# Patient Record
Sex: FEMALE | Race: WHITE | URBAN - METROPOLITAN AREA
[De-identification: names, ages, dates, MRNs, and addresses within clinical notes are randomized per-mention and may not be internally consistent; named-entity substitution may affect disease eponyms.]

---

## 2018-02-25 ENCOUNTER — OFFICE VISIT (OUTPATIENT)
Dept: PRIMARY CARE CLINIC | Age: 63
End: 2018-02-25
Payer: MEDICARE

## 2018-02-25 VITALS
TEMPERATURE: 98 F | DIASTOLIC BLOOD PRESSURE: 76 MMHG | OXYGEN SATURATION: 98 % | HEART RATE: 80 BPM | RESPIRATION RATE: 16 BRPM | HEIGHT: 62 IN | BODY MASS INDEX: 29.44 KG/M2 | SYSTOLIC BLOOD PRESSURE: 124 MMHG | WEIGHT: 160 LBS

## 2018-02-25 DIAGNOSIS — H66.003 ACUTE SUPPURATIVE OTITIS MEDIA OF BOTH EARS WITHOUT SPONTANEOUS RUPTURE OF TYMPANIC MEMBRANES, RECURRENCE NOT SPECIFIED: Primary | ICD-10-CM

## 2018-02-25 DIAGNOSIS — H61.23 BILATERAL IMPACTED CERUMEN: ICD-10-CM

## 2018-02-25 PROCEDURE — G8419 CALC BMI OUT NRM PARAM NOF/U: HCPCS | Performed by: NURSE PRACTITIONER

## 2018-02-25 PROCEDURE — G8400 PT W/DXA NO RESULTS DOC: HCPCS | Performed by: NURSE PRACTITIONER

## 2018-02-25 PROCEDURE — 4040F PNEUMOC VAC/ADMIN/RCVD: CPT | Performed by: NURSE PRACTITIONER

## 2018-02-25 PROCEDURE — 3017F COLORECTAL CA SCREEN DOC REV: CPT | Performed by: NURSE PRACTITIONER

## 2018-02-25 PROCEDURE — G8427 DOCREV CUR MEDS BY ELIG CLIN: HCPCS | Performed by: NURSE PRACTITIONER

## 2018-02-25 PROCEDURE — 69210 REMOVE IMPACTED EAR WAX UNI: CPT | Performed by: NURSE PRACTITIONER

## 2018-02-25 PROCEDURE — 1036F TOBACCO NON-USER: CPT | Performed by: NURSE PRACTITIONER

## 2018-02-25 PROCEDURE — 1090F PRES/ABSN URINE INCON ASSESS: CPT | Performed by: NURSE PRACTITIONER

## 2018-02-25 PROCEDURE — 1123F ACP DISCUSS/DSCN MKR DOCD: CPT | Performed by: NURSE PRACTITIONER

## 2018-02-25 PROCEDURE — 99213 OFFICE O/P EST LOW 20 MIN: CPT | Performed by: NURSE PRACTITIONER

## 2018-02-25 PROCEDURE — G8484 FLU IMMUNIZE NO ADMIN: HCPCS | Performed by: NURSE PRACTITIONER

## 2018-02-25 PROCEDURE — 3014F SCREEN MAMMO DOC REV: CPT | Performed by: NURSE PRACTITIONER

## 2018-02-25 RX ORDER — CHLORAL HYDRATE 500 MG
1 CAPSULE ORAL
COMMUNITY

## 2018-02-25 RX ORDER — CYANOCOBALAMIN 1000 UG/ML
1000 INJECTION INTRAMUSCULAR; SUBCUTANEOUS ONCE
COMMUNITY

## 2018-02-25 RX ORDER — CEFDINIR 300 MG/1
600 CAPSULE ORAL DAILY
Qty: 20 CAPSULE | Refills: 0 | Status: SHIPPED | OUTPATIENT
Start: 2018-02-25 | End: 2018-03-07

## 2018-02-25 ASSESSMENT — ENCOUNTER SYMPTOMS
ABDOMINAL PAIN: 0
COUGH: 0
SORE THROAT: 1
VOMITING: 0
SINUS PRESSURE: 1
WHEEZING: 0
RHINORRHEA: 1
SHORTNESS OF BREATH: 0

## 2018-02-25 NOTE — PROGRESS NOTES
Negative for rash. Neurological: Positive for headaches. Objective:   /76   Pulse 80   Temp 98 °F (36.7 °C) (Tympanic)   Resp 16   Ht 5' 1.5\" (1.562 m)   Wt 160 lb (72.6 kg)   SpO2 98%   BMI 29.74 kg/m²     Physical Exam   Constitutional: She is oriented to person, place, and time. She appears well-developed and well-nourished. HENT:   Head: Normocephalic. Right Ear: Ear canal normal. There is tenderness. Left Ear: There is tenderness. Ears:    Nose: Nose normal.   Mouth/Throat: Uvula is midline, oropharynx is clear and moist and mucous membranes are normal. No oropharyngeal exudate. Eyes: Conjunctivae are normal. Right eye exhibits no discharge. Left eye exhibits no discharge. Neck: Normal range of motion. Cardiovascular: Normal rate, regular rhythm and normal heart sounds. Pulmonary/Chest: Effort normal and breath sounds normal. No respiratory distress. She has no wheezes. She has no rales. Lymphadenopathy:     She has no cervical adenopathy. Neurological: She is alert and oriented to person, place, and time. Skin: Skin is warm and dry. Psychiatric: She has a normal mood and affect. Her behavior is normal.       Assessment:      1. Acute suppurative otitis media of both ears without spontaneous rupture of tympanic membranes, recurrence not specified  cefdinir (OMNICEF) 300 MG capsule    neomycin-polymyxin-hydrocortisone (CORTISPORIN) 3.5-82220-5 otic solution   2. Bilateral impacted cerumen  MN REMOVAL IMPACTED CERUMEN INSTRUMENTATION UNILAT       Plan:      Orders Placed This Encounter   Procedures    MN REMOVAL IMPACTED CERUMEN INSTRUMENTATION UNILAT     Ear wax was removed with a mixture of warm water/hydrogen peroxide flush and currette. Small amount of ear wax removed from bilateral ears. Pt tolerated well. No infection present.        Orders Placed This Encounter   Medications    cefdinir (OMNICEF) 300 MG capsule     Sig: Take 2 capsules by mouth daily for 10 days     Dispense:  20 capsule     Refill:  0    neomycin-polymyxin-hydrocortisone (CORTISPORIN) 3.5-64566-8 otic solution     Sig: Place 3 drops into both ears 4 times daily     Dispense:  1 Bottle     Refill:  0       Return if symptoms worsen or fail to improve, for reevaluation and further treatment with PCP. Encouraged increase in fluids and rest. Ibuprofen and tylenol as needed. Discussed otc comfort care. Reviewed with the patient: current clinical status, medications, activities and diet. Side effects, adverse effects of the medication prescribed today, as well as treatment plan/ rationale and result expectations have been discussed with the patient who expresses understanding and desires to proceed. Close follow up to evaluate treatment results and for coordination of care. I have reviewed the patient's medical history in detail and updated the computerized patient record.     Derrek Kehr, CNP

## 2023-07-21 DIAGNOSIS — B00.1 COLD SORE: ICD-10-CM

## 2023-07-21 RX ORDER — VALACYCLOVIR HYDROCHLORIDE 1 G/1
2000 TABLET, FILM COATED ORAL 2 TIMES DAILY
COMMUNITY
Start: 2021-08-06 | End: 2023-07-21 | Stop reason: SDUPTHER

## 2023-07-21 RX ORDER — VALACYCLOVIR HYDROCHLORIDE 1 G/1
2000 TABLET, FILM COATED ORAL 4 TIMES DAILY
Qty: 16 TABLET | Refills: 0 | Status: SHIPPED | OUTPATIENT
Start: 2023-07-21 | End: 2023-08-21 | Stop reason: SDUPTHER

## 2023-07-23 PROBLEM — I25.84 CORONARY ARTERY CALCIFICATION: Status: ACTIVE | Noted: 2023-07-23

## 2023-07-23 PROBLEM — E21.1 SECONDARY HYPERPARATHYROIDISM, NON-RENAL (MULTI): Status: ACTIVE | Noted: 2023-07-23

## 2023-07-23 PROBLEM — H90.3 BILATERAL SENSORINEURAL HEARING LOSS: Status: ACTIVE | Noted: 2023-07-23

## 2023-07-23 PROBLEM — R79.89 TSH ELEVATION: Status: ACTIVE | Noted: 2023-07-23

## 2023-07-23 PROBLEM — B00.1 RECURRENT HERPES LABIALIS: Status: ACTIVE | Noted: 2023-07-23

## 2023-07-23 PROBLEM — K08.109 EDENTULOUS: Status: ACTIVE | Noted: 2023-07-23

## 2023-07-23 PROBLEM — K57.30 DIVERTICULOSIS OF COLON: Status: ACTIVE | Noted: 2023-07-23

## 2023-07-23 PROBLEM — J30.9 ALLERGIC RHINITIS: Status: ACTIVE | Noted: 2023-07-23

## 2023-07-23 PROBLEM — Z92.89 H/O ECHOCARDIOGRAM: Status: RESOLVED | Noted: 2023-07-23 | Resolved: 2023-07-23

## 2023-07-23 PROBLEM — Z71.89 ADVANCED DIRECTIVES, COUNSELING/DISCUSSION: Status: ACTIVE | Noted: 2023-07-23

## 2023-07-23 PROBLEM — M85.9 DISORDER OF BONE DENSITY AND STRUCTURE, UNSPECIFIED: Status: ACTIVE | Noted: 2023-07-23

## 2023-07-23 PROBLEM — F32.1 CURRENT MODERATE EPISODE OF MAJOR DEPRESSIVE DISORDER WITHOUT PRIOR EPISODE (MULTI): Status: ACTIVE | Noted: 2023-07-23

## 2023-07-23 PROBLEM — R73.01 IFG (IMPAIRED FASTING GLUCOSE): Status: ACTIVE | Noted: 2023-07-23

## 2023-07-23 PROBLEM — I25.10 CORONARY ARTERY CALCIFICATION: Status: ACTIVE | Noted: 2023-07-23

## 2023-07-23 PROBLEM — Z71.89 CARDIAC RISK COUNSELING: Status: ACTIVE | Noted: 2023-07-23

## 2023-07-23 PROBLEM — I77.9 DISORDER OF AORTA (CMS-HCC): Status: ACTIVE | Noted: 2022-08-23

## 2023-07-23 PROBLEM — Z92.89 H/O ECHOCARDIOGRAM: Status: ACTIVE | Noted: 2023-07-23

## 2023-07-23 PROBLEM — R93.1 ABNORMAL SCREENING CARDIAC CT: Status: ACTIVE | Noted: 2023-07-23

## 2023-07-23 PROBLEM — E78.5 HYPERLIPIDEMIA: Status: ACTIVE | Noted: 2023-07-23

## 2023-07-23 PROBLEM — Z90.89 H/O MASTOIDECTOMY: Status: ACTIVE | Noted: 2023-07-23

## 2023-07-23 PROBLEM — N20.0 CALCULUS OF KIDNEY: Status: ACTIVE | Noted: 2022-07-01

## 2023-07-23 PROBLEM — K64.0 GRADE I HEMORRHOIDS: Status: ACTIVE | Noted: 2023-07-23

## 2023-07-23 PROBLEM — H60.60 CHRONIC OTITIS EXTERNA: Status: ACTIVE | Noted: 2023-07-23

## 2023-07-23 PROBLEM — Z86.010 HISTORY OF COLON POLYPS: Status: ACTIVE | Noted: 2023-07-23

## 2023-07-23 PROBLEM — K21.00 GASTROESOPHAGEAL REFLUX DISEASE WITH ESOPHAGITIS: Status: ACTIVE | Noted: 2023-07-23

## 2023-07-23 PROBLEM — J44.9 CHRONIC OBSTRUCTIVE PULMONARY DISEASE (MULTI): Status: ACTIVE | Noted: 2022-08-23

## 2023-07-23 PROBLEM — K76.89 LIVER CYST: Status: ACTIVE | Noted: 2023-07-23

## 2023-07-23 PROBLEM — F41.9 ANXIETY: Status: ACTIVE | Noted: 2023-07-23

## 2023-07-23 PROBLEM — I47.19 PAT (PAROXYSMAL ATRIAL TACHYCARDIA) (CMS-HCC): Status: ACTIVE | Noted: 2023-07-23

## 2023-07-23 PROBLEM — I51.9 DIASTOLIC DYSFUNCTION, LEFT VENTRICLE: Status: ACTIVE | Noted: 2023-07-23

## 2023-07-23 PROBLEM — I10 HYPERTENSION: Status: ACTIVE | Noted: 2023-07-23

## 2023-07-23 PROBLEM — N28.1 RENAL CYST, RIGHT: Status: ACTIVE | Noted: 2023-07-23

## 2023-07-23 PROBLEM — K59.00 CONSTIPATION: Status: ACTIVE | Noted: 2023-07-23

## 2023-07-23 PROBLEM — Z86.0100 HISTORY OF COLON POLYPS: Status: ACTIVE | Noted: 2023-07-23

## 2023-07-23 PROBLEM — Z86.39 H/O HYPERPARATHYROIDISM: Status: ACTIVE | Noted: 2023-07-23

## 2023-07-23 PROBLEM — E55.9 VITAMIN D DEFICIENCY: Status: ACTIVE | Noted: 2023-07-23

## 2023-07-23 PROBLEM — I25.9 ASYMPTOMATIC CORONARY HEART DISEASE: Status: ACTIVE | Noted: 2022-08-23

## 2023-07-23 PROBLEM — R91.1 LUNG NODULE: Status: ACTIVE | Noted: 2023-07-23

## 2023-07-23 PROBLEM — Z00.00 ROUTINE ADULT HEALTH MAINTENANCE: Status: ACTIVE | Noted: 2023-07-23

## 2023-08-21 ENCOUNTER — LAB (OUTPATIENT)
Dept: LAB | Facility: LAB | Age: 68
End: 2023-08-21
Payer: MEDICARE

## 2023-08-21 ENCOUNTER — OFFICE VISIT (OUTPATIENT)
Dept: PRIMARY CARE | Facility: CLINIC | Age: 68
End: 2023-08-21
Payer: MEDICARE

## 2023-08-21 VITALS
WEIGHT: 160.5 LBS | SYSTOLIC BLOOD PRESSURE: 126 MMHG | BODY MASS INDEX: 30.3 KG/M2 | HEIGHT: 61 IN | OXYGEN SATURATION: 95 % | TEMPERATURE: 98 F | HEART RATE: 70 BPM | DIASTOLIC BLOOD PRESSURE: 76 MMHG

## 2023-08-21 DIAGNOSIS — E55.9 VITAMIN D DEFICIENCY: ICD-10-CM

## 2023-08-21 DIAGNOSIS — I25.84 CORONARY ARTERY CALCIFICATION: ICD-10-CM

## 2023-08-21 DIAGNOSIS — Z12.11 SCREENING FOR COLON CANCER: ICD-10-CM

## 2023-08-21 DIAGNOSIS — Z71.89 CARDIAC RISK COUNSELING: ICD-10-CM

## 2023-08-21 DIAGNOSIS — Z12.31 ENCOUNTER FOR SCREENING MAMMOGRAM FOR BREAST CANCER: ICD-10-CM

## 2023-08-21 DIAGNOSIS — E21.1 SECONDARY HYPERPARATHYROIDISM, NON-RENAL (MULTI): ICD-10-CM

## 2023-08-21 DIAGNOSIS — Z00.00 ROUTINE ADULT HEALTH MAINTENANCE: Primary | ICD-10-CM

## 2023-08-21 DIAGNOSIS — E78.5 HYPERLIPIDEMIA, UNSPECIFIED HYPERLIPIDEMIA TYPE: ICD-10-CM

## 2023-08-21 DIAGNOSIS — Z71.89 ADVANCED DIRECTIVES, COUNSELING/DISCUSSION: ICD-10-CM

## 2023-08-21 DIAGNOSIS — F32.1 CURRENT MODERATE EPISODE OF MAJOR DEPRESSIVE DISORDER WITHOUT PRIOR EPISODE (MULTI): ICD-10-CM

## 2023-08-21 DIAGNOSIS — I10 HYPERTENSION, UNSPECIFIED TYPE: ICD-10-CM

## 2023-08-21 DIAGNOSIS — I25.10 CORONARY ARTERY CALCIFICATION: ICD-10-CM

## 2023-08-21 DIAGNOSIS — K21.00 GASTROESOPHAGEAL REFLUX DISEASE WITH ESOPHAGITIS, UNSPECIFIED WHETHER HEMORRHAGE: ICD-10-CM

## 2023-08-21 DIAGNOSIS — I47.19 PAT (PAROXYSMAL ATRIAL TACHYCARDIA) (CMS-HCC): ICD-10-CM

## 2023-08-21 DIAGNOSIS — B00.1 COLD SORE: ICD-10-CM

## 2023-08-21 DIAGNOSIS — J44.9 CHRONIC OBSTRUCTIVE PULMONARY DISEASE, UNSPECIFIED COPD TYPE (MULTI): ICD-10-CM

## 2023-08-21 DIAGNOSIS — Z13.89 SCREENING FOR MULTIPLE CONDITIONS: ICD-10-CM

## 2023-08-21 PROBLEM — Z90.89 H/O MASTOIDECTOMY: Status: RESOLVED | Noted: 2023-07-23 | Resolved: 2023-08-21

## 2023-08-21 PROBLEM — I25.9 ASYMPTOMATIC CORONARY HEART DISEASE: Status: RESOLVED | Noted: 2022-08-23 | Resolved: 2023-08-21

## 2023-08-21 PROBLEM — Z86.39 H/O HYPERPARATHYROIDISM: Status: RESOLVED | Noted: 2023-07-23 | Resolved: 2023-08-21

## 2023-08-21 PROBLEM — H60.60 CHRONIC OTITIS EXTERNA: Status: RESOLVED | Noted: 2023-07-23 | Resolved: 2023-08-21

## 2023-08-21 PROBLEM — I73.9 PVD (PERIPHERAL VASCULAR DISEASE) (CMS-HCC): Status: ACTIVE | Noted: 2022-08-23

## 2023-08-21 PROBLEM — I73.9 PVD (PERIPHERAL VASCULAR DISEASE) (CMS-HCC): Status: RESOLVED | Noted: 2022-08-23 | Resolved: 2023-08-21

## 2023-08-21 LAB
ALANINE AMINOTRANSFERASE (SGPT) (U/L) IN SER/PLAS: 14 U/L (ref 7–45)
ALBUMIN (G/DL) IN SER/PLAS: 4.4 G/DL (ref 3.4–5)
ALBUMIN (MG/L) IN URINE: <7 MG/L
ALBUMIN/CREATININE (UG/MG) IN URINE: NORMAL UG/MG CRT (ref 0–30)
ALKALINE PHOSPHATASE (U/L) IN SER/PLAS: 50 U/L (ref 33–136)
ANION GAP IN SER/PLAS: 12 MMOL/L (ref 10–20)
APPEARANCE, URINE: CLEAR
ASPARTATE AMINOTRANSFERASE (SGOT) (U/L) IN SER/PLAS: 18 U/L (ref 9–39)
BACTERIA, URINE: ABNORMAL /HPF
BASOPHILS (10*3/UL) IN BLOOD BY AUTOMATED COUNT: 0.06 X10E9/L (ref 0–0.1)
BASOPHILS/100 LEUKOCYTES IN BLOOD BY AUTOMATED COUNT: 1.2 % (ref 0–2)
BILIRUBIN TOTAL (MG/DL) IN SER/PLAS: 0.5 MG/DL (ref 0–1.2)
BILIRUBIN, URINE: NEGATIVE
BLOOD, URINE: NEGATIVE
CALCIDIOL (25 OH VITAMIN D3) (NG/ML) IN SER/PLAS: 46 NG/ML
CALCIUM (MG/DL) IN SER/PLAS: 9.9 MG/DL (ref 8.6–10.3)
CARBON DIOXIDE, TOTAL (MMOL/L) IN SER/PLAS: 30 MMOL/L (ref 21–32)
CHLORIDE (MMOL/L) IN SER/PLAS: 103 MMOL/L (ref 98–107)
CHOLESTEROL (MG/DL) IN SER/PLAS: 175 MG/DL (ref 0–199)
CHOLESTEROL IN HDL (MG/DL) IN SER/PLAS: 56.1 MG/DL
CHOLESTEROL/HDL RATIO: 3.1
COLOR, URINE: YELLOW
CREATININE (MG/DL) IN SER/PLAS: 0.8 MG/DL (ref 0.5–1.05)
CREATININE (MG/DL) IN URINE: 69 MG/DL (ref 20–320)
EOSINOPHILS (10*3/UL) IN BLOOD BY AUTOMATED COUNT: 0.35 X10E9/L (ref 0–0.7)
EOSINOPHILS/100 LEUKOCYTES IN BLOOD BY AUTOMATED COUNT: 7.3 % (ref 0–6)
ERYTHROCYTE DISTRIBUTION WIDTH (RATIO) BY AUTOMATED COUNT: 12.3 % (ref 11.5–14.5)
ERYTHROCYTE MEAN CORPUSCULAR HEMOGLOBIN CONCENTRATION (G/DL) BY AUTOMATED: 32.4 G/DL (ref 32–36)
ERYTHROCYTE MEAN CORPUSCULAR VOLUME (FL) BY AUTOMATED COUNT: 95 FL (ref 80–100)
ERYTHROCYTES (10*6/UL) IN BLOOD BY AUTOMATED COUNT: 4.63 X10E12/L (ref 4–5.2)
GFR FEMALE: 79 ML/MIN/1.73M2
GLUCOSE (MG/DL) IN SER/PLAS: 86 MG/DL (ref 74–99)
GLUCOSE, URINE: NEGATIVE MG/DL
HEMATOCRIT (%) IN BLOOD BY AUTOMATED COUNT: 44.1 % (ref 36–46)
HEMOGLOBIN (G/DL) IN BLOOD: 14.3 G/DL (ref 12–16)
IMMATURE GRANULOCYTES/100 LEUKOCYTES IN BLOOD BY AUTOMATED COUNT: 0.2 % (ref 0–0.9)
KETONES, URINE: NEGATIVE MG/DL
LDL: 107 MG/DL (ref 0–99)
LEUKOCYTE ESTERASE, URINE: ABNORMAL
LEUKOCYTES (10*3/UL) IN BLOOD BY AUTOMATED COUNT: 4.8 X10E9/L (ref 4.4–11.3)
LYMPHOCYTES (10*3/UL) IN BLOOD BY AUTOMATED COUNT: 1.62 X10E9/L (ref 1.2–4.8)
LYMPHOCYTES/100 LEUKOCYTES IN BLOOD BY AUTOMATED COUNT: 33.6 % (ref 13–44)
MONOCYTES (10*3/UL) IN BLOOD BY AUTOMATED COUNT: 0.45 X10E9/L (ref 0.1–1)
MONOCYTES/100 LEUKOCYTES IN BLOOD BY AUTOMATED COUNT: 9.3 % (ref 2–10)
NEUTROPHILS (10*3/UL) IN BLOOD BY AUTOMATED COUNT: 2.33 X10E9/L (ref 1.2–7.7)
NEUTROPHILS/100 LEUKOCYTES IN BLOOD BY AUTOMATED COUNT: 48.4 % (ref 40–80)
NITRITE, URINE: NEGATIVE
PARATHYRIN INTACT (PG/ML) IN SER/PLAS: 102.6 PG/ML (ref 18.5–88)
PH, URINE: 7 (ref 5–8)
PLATELETS (10*3/UL) IN BLOOD AUTOMATED COUNT: 187 X10E9/L (ref 150–450)
POTASSIUM (MMOL/L) IN SER/PLAS: 4.4 MMOL/L (ref 3.5–5.3)
PROTEIN TOTAL: 6.8 G/DL (ref 6.4–8.2)
PROTEIN, URINE: NEGATIVE MG/DL
RBC, URINE: 1 /HPF (ref 0–5)
SODIUM (MMOL/L) IN SER/PLAS: 141 MMOL/L (ref 136–145)
SPECIFIC GRAVITY, URINE: 1.01 (ref 1–1.03)
SQUAMOUS EPITHELIAL CELLS, URINE: <1 /HPF
THYROTROPIN (MIU/L) IN SER/PLAS BY DETECTION LIMIT <= 0.05 MIU/L: 3.72 MIU/L (ref 0.44–3.98)
TRIGLYCERIDE (MG/DL) IN SER/PLAS: 61 MG/DL (ref 0–149)
UREA NITROGEN (MG/DL) IN SER/PLAS: 17 MG/DL (ref 6–23)
UROBILINOGEN, URINE: <2 MG/DL (ref 0–1.9)
VLDL: 12 MG/DL (ref 0–40)
WBC, URINE: 3 /HPF (ref 0–5)

## 2023-08-21 PROCEDURE — 1036F TOBACCO NON-USER: CPT | Performed by: INTERNAL MEDICINE

## 2023-08-21 PROCEDURE — 82570 ASSAY OF URINE CREATININE: CPT

## 2023-08-21 PROCEDURE — 1125F AMNT PAIN NOTED PAIN PRSNT: CPT | Performed by: INTERNAL MEDICINE

## 2023-08-21 PROCEDURE — G0439 PPPS, SUBSEQ VISIT: HCPCS | Performed by: INTERNAL MEDICINE

## 2023-08-21 PROCEDURE — G0444 DEPRESSION SCREEN ANNUAL: HCPCS | Performed by: INTERNAL MEDICINE

## 2023-08-21 PROCEDURE — 84443 ASSAY THYROID STIM HORMONE: CPT

## 2023-08-21 PROCEDURE — 85025 COMPLETE CBC W/AUTO DIFF WBC: CPT

## 2023-08-21 PROCEDURE — 80053 COMPREHEN METABOLIC PANEL: CPT

## 2023-08-21 PROCEDURE — 1159F MED LIST DOCD IN RCRD: CPT | Performed by: INTERNAL MEDICINE

## 2023-08-21 PROCEDURE — 82306 VITAMIN D 25 HYDROXY: CPT

## 2023-08-21 PROCEDURE — G0009 ADMIN PNEUMOCOCCAL VACCINE: HCPCS | Performed by: INTERNAL MEDICINE

## 2023-08-21 PROCEDURE — 82043 UR ALBUMIN QUANTITATIVE: CPT

## 2023-08-21 PROCEDURE — 3074F SYST BP LT 130 MM HG: CPT | Performed by: INTERNAL MEDICINE

## 2023-08-21 PROCEDURE — 81001 URINALYSIS AUTO W/SCOPE: CPT

## 2023-08-21 PROCEDURE — 99214 OFFICE O/P EST MOD 30 MIN: CPT | Performed by: INTERNAL MEDICINE

## 2023-08-21 PROCEDURE — 3078F DIAST BP <80 MM HG: CPT | Performed by: INTERNAL MEDICINE

## 2023-08-21 PROCEDURE — 83970 ASSAY OF PARATHORMONE: CPT

## 2023-08-21 PROCEDURE — G0446 INTENS BEHAVE THER CARDIO DX: HCPCS | Performed by: INTERNAL MEDICINE

## 2023-08-21 PROCEDURE — 80061 LIPID PANEL: CPT

## 2023-08-21 PROCEDURE — 90677 PCV20 VACCINE IM: CPT | Performed by: INTERNAL MEDICINE

## 2023-08-21 PROCEDURE — 36415 COLL VENOUS BLD VENIPUNCTURE: CPT

## 2023-08-21 PROCEDURE — 99497 ADVNCD CARE PLAN 30 MIN: CPT | Performed by: INTERNAL MEDICINE

## 2023-08-21 RX ORDER — SERTRALINE HYDROCHLORIDE 50 MG/1
50 TABLET, FILM COATED ORAL DAILY
Qty: 90 TABLET | Refills: 3 | Status: SHIPPED | OUTPATIENT
Start: 2023-08-21 | End: 2024-02-15 | Stop reason: SDUPTHER

## 2023-08-21 RX ORDER — VALACYCLOVIR HYDROCHLORIDE 1 G/1
2000 TABLET, FILM COATED ORAL 2 TIMES DAILY
Qty: 12 TABLET | Refills: 3 | Status: SHIPPED | OUTPATIENT
Start: 2023-08-21

## 2023-08-21 RX ORDER — METOPROLOL SUCCINATE 50 MG/1
50 TABLET, EXTENDED RELEASE ORAL DAILY
Qty: 90 TABLET | Refills: 3
Start: 2023-08-21 | End: 2023-12-10

## 2023-08-21 RX ORDER — SERTRALINE HYDROCHLORIDE 50 MG/1
50 TABLET, FILM COATED ORAL DAILY
COMMUNITY
End: 2023-08-21 | Stop reason: SDUPTHER

## 2023-08-21 RX ORDER — ALBUTEROL SULFATE 90 UG/1
2 AEROSOL, METERED RESPIRATORY (INHALATION) EVERY 4 HOURS PRN
COMMUNITY
Start: 2010-02-01

## 2023-08-21 RX ORDER — OMEPRAZOLE 40 MG/1
40 CAPSULE, DELAYED RELEASE ORAL
Qty: 90 CAPSULE | Refills: 3
Start: 2023-08-21 | End: 2023-11-22 | Stop reason: SDUPTHER

## 2023-08-21 RX ORDER — ROSUVASTATIN CALCIUM 10 MG/1
10 TABLET, COATED ORAL DAILY
Qty: 90 TABLET | Refills: 3
Start: 2023-08-21 | End: 2024-03-18 | Stop reason: SDUPTHER

## 2023-08-21 RX ORDER — CETIRIZINE HYDROCHLORIDE, PSEUDOEPHEDRINE HYDROCHLORIDE 5; 120 MG/1; MG/1
1 TABLET, FILM COATED, EXTENDED RELEASE ORAL 2 TIMES DAILY
COMMUNITY
Start: 2019-09-08 | End: 2024-04-18 | Stop reason: WASHOUT

## 2023-08-21 RX ORDER — LANOLIN ALCOHOL/MO/W.PET/CERES
1 CREAM (GRAM) TOPICAL DAILY
COMMUNITY
Start: 2021-08-06

## 2023-08-21 RX ORDER — BUDESONIDE AND FORMOTEROL FUMARATE DIHYDRATE 160; 4.5 UG/1; UG/1
2 AEROSOL RESPIRATORY (INHALATION) 2 TIMES DAILY
COMMUNITY
Start: 2023-01-31 | End: 2024-03-18 | Stop reason: WASHOUT

## 2023-08-21 RX ORDER — ACETAMINOPHEN 500 MG
1 TABLET ORAL DAILY
COMMUNITY
Start: 2021-08-06

## 2023-08-21 RX ORDER — FAMOTIDINE 20 MG/1
20 TABLET, FILM COATED ORAL 2 TIMES DAILY
Qty: 180 TABLET | Refills: 3
Start: 2023-08-21 | End: 2024-02-15 | Stop reason: WASHOUT

## 2023-08-21 RX ORDER — IBUPROFEN 100 MG/5ML
1 SUSPENSION, ORAL (FINAL DOSE FORM) ORAL DAILY
COMMUNITY
Start: 2021-08-06

## 2023-08-21 ASSESSMENT — PATIENT HEALTH QUESTIONNAIRE - PHQ9
1. LITTLE INTEREST OR PLEASURE IN DOING THINGS: NOT AT ALL
2. FEELING DOWN, DEPRESSED OR HOPELESS: NOT AT ALL
SUM OF ALL RESPONSES TO PHQ9 QUESTIONS 1 AND 2: 0

## 2023-08-21 NOTE — ASSESSMENT & PLAN NOTE
Annual Wellness exam completed   Preventive Health history reviewed:  Vaccines today: PCV 20  Labs ordered    Mammogram ordered  Cscope ordered    Depression Screening done  Advanced Directives Discussion Completed  Cardiovascular risk discussed and if needed, lifestyle modifications recommended, including nutritional choices, exercise, and elimination of habits contributing to risk.  We agreed on a plan to reduce the current cardiovascular risk.  See ecalc ASCVD Risk  Plus for data discussed regarding risk and risk reduction opportunities.  Aspirin use/disuse was discussed after reviewing the updated guidelines.

## 2023-08-21 NOTE — PROGRESS NOTES
Assessment and Plan:  Problem List Items Addressed This Visit          High    Routine adult health maintenance - Primary    Overview     Influenza 9/27/21, 10/1/22  Pfizer COVID 19 vaccine 1/28/21, 2/18/21, 9/28/21, 4/23/22, 10/1/22  Shingrix Oct 26 2020, Mar  8, 2021          Tdap (1) Apr 2, 2012   Td 8/6/21       Pneumovax 23 5/5/2020  Prevnar 7/26/18  Mamm 10/15/2004; 8/24/21, 8/23/22  Cscope 2018 (5yrs)  BMD:8/17/21, 8/18/23 8/8/22: 8/8/22: Current 10-Year ASCVD Risk:  12.44% - Intermediate Risk   11/21 US aorta (-), 7/22 US also (-)         Current Assessment & Plan     Annual Wellness exam completed   Preventive Health history reviewed:  Vaccines today: PCV 20  Labs ordered    Mammogram ordered  Cscope ordered    Depression Screening done  Advanced Directives Discussion Completed  Cardiovascular risk discussed and if needed, lifestyle modifications recommended, including nutritional choices, exercise, and elimination of habits contributing to risk.  We agreed on a plan to reduce the current cardiovascular risk.  See ecalc ASCVD Risk  Plus for data discussed regarding risk and risk reduction opportunities.  Aspirin use/disuse was discussed after reviewing the updated guidelines.            Relevant Orders    Pneumococcal conjugate vaccine, 20-valent, adult (PREVNAR 20) (Completed)       Medium    Advanced directives, counseling/discussion    Overview     8/21/23: Maico Martinez (), Janey Noonan (Daughter) is her HCPOA  Full Code unless vegetative state anticipated         Cardiac risk counseling    Overview     8/21/23: Current 10-Year ASCVD Risk:  12% - Intermediate Risk    ASA not indicated         Chronic obstructive pulmonary disease (CMS/HCC)    Overview     COPD: Gold stage III based on most recent pulmonary function test.   Her MMRC is 1 - Group C  On Symbicort (inconsistent) and short acting bronchodilators every 4-6 hours as needed   CT chest 8/22: Moderate upper lung predominant  emphysema.  Former smoker  PFTs 2018: Severe COPD  Managed by  Pulm  Stable         Cold sore    Relevant Medications    valACYclovir (Valtrex) 1 gram tablet    Coronary artery calcification    Overview     8/22: Ct lung:. Moderate coronary artery calcification.   Severe calcification of the thoracic and abdominal aorta  On statin         Relevant Medications    metoprolol succinate XL (Toprol-XL) 50 mg 24 hr tablet    rosuvastatin (Crestor) 10 mg tablet    Current moderate episode of major depressive disorder without prior episode (CMS/HCC)    Overview     On Zoloft  Stable  Monitor         Relevant Medications    sertraline (Zoloft) 50 mg tablet    Encounter for screening mammogram for breast cancer    Relevant Orders    BI mammo bilateral screening tomosynthesis    Gastroesophageal reflux disease with esophagitis    Overview     2018: EGD showed a 2 cm hiatal hernia and LA grade A esophagitis.;  w/HH  on Pepcid prn and PPI daily         Relevant Medications    famotidine (Pepcid) 20 mg tablet    omeprazole (PriLOSEC) 40 mg DR capsule    Hyperlipidemia    Overview     Comment on above: Goal LDL <100;  Goal LDL <100on statin monitor;  Goal LDL <100on statin 2x/weekmonitor;         Relevant Medications    rosuvastatin (Crestor) 10 mg tablet    Other Relevant Orders    TSH with reflex to Free T4 if abnormal    Comprehensive Metabolic Panel    CBC and Auto Differential    Lipid Panel    Hypertension    Overview     Comment on above: Goal <130/80NAS dietExercise;  Goal <130/80NAS dietACEi caused coughTolerated BB in past;         Relevant Orders    Urinalysis with Reflex Microscopic    Comprehensive Metabolic Panel    CBC and Auto Differential    Lipid Panel    Albumin, urine, random    PAT (paroxysmal atrial tachycardia) (CMS/HCC)    Overview      ZioPatch 5/7/18 demonstrated a short run of atrial tachycardia.   Triggered event corresponded to sinus rhythm  on Toprol;         Relevant Medications    metoprolol  succinate XL (Toprol-XL) 50 mg 24 hr tablet    Screening for colon cancer    Relevant Orders    Colonoscopy    Screening for multiple conditions    Overview     Depression screening completed using the PHQ-2 questions with results documented in the chart/encounter  (See Rooming Screenings for documentation)           Secondary hyperparathyroidism, non-renal (CMS/HCC)    Overview     8/21: Vit D 35  7/22: .8 (was on Vitamin D once daily), previously *87.1 (was on Vitamin D 2 /day)  S/P endo consult         Relevant Orders    PTH, intact    Vitamin D deficiency    Overview     Goal ~40 (hx kidney stones)  On supplement  2/daily keeps her PTH normalized         Relevant Orders    Vitamin D, Total    Comprehensive Metabolic Panel    CBC and Auto Differential       Chief Complaint:   Medicare Wellness Exam/Comprehensive Problem Focused Follow Up and Physical Exam    HPI: Saw Cardiology 1/23: (Copied):  Impressions     This 67-year-old hypertensive former smoker with hyperlipidemia, and a strong family history of premature coronary artery disease complains of episodic palpitations as described. Prior work-up in 2018 with Dr. Mueller disclosed a noninvasive monitor study revealing a run of atrial tachycardia, managed with metoprolol. She has coronary artery calcification seen on multiple CT scans in the past, and a coronary artery calcium score done in August 2021 was elevated at 328. A SPECT done in September 2021 was negative for ischemia, and she has normal LV function without significant valvular or pericardial disease by echocardiography in 2018.     The patient's 10 year event rate according to [place] with calcium score is 12%, placing the patient at intermediate risk for future cardiac events. Our approach:     1. Event monitor  2. I have reviewed her recent labs including lipid profile from July 2022.  3. At her next visit, we will discuss increasing her rosuvastatin.    Saw Endo 6/22 (copied):  Provider  Impressions     - secondary hyperparathyroidism due to vitamin D deficiency now normalized ,she is now consistent on vitamin D 4000 IU supplement .  - Osteoporosis likely due to early surgical menopause.  - subclinical hypothyroidism vs abnormal TSH due to high dose Biotin use (managed by pcp )     follow up 1/2023 with labs     Saw Pulm 1/22 (copied):  Patient Discussion/Summary     Ms. Salazar is a 68 year old CF (Former smoker ~55 pack years) here for initial evaluation of COPD and abnormal CT chest.      1. COPD: former smoker - PFTs with severe obstruction in 2018 - BD resp.   - start symbicort 160 2 puffs twice daily - rinse mouth out afterwards- any issues obtaining this call my nurse   - continue albuterol HFA inhaler 2 puffs or albuterol nebulizer treatment every 4-6 hours as needed for shortness of breath   - PFTs and 6MWT - breathing tests before next appt      2. Former smoker: quit smoking 12 years ago   - lung cancer screening in 11/2023     She is inconsistent with symbicort    Last labs reviewed:  Component      Latest Ref Rng 1/9/2023   WBC      4.4 - 11.3 x10E9/L 4.9    RBC      4.00 - 5.20 x10E12/L 4.85    HEMOGLOBIN      12.0 - 16.0 g/dL 14.9    HEMATOCRIT      36.0 - 46.0 % 46.3 (H)    MCV      80 - 100 fL 95    MCHC      32.0 - 36.0 g/dL 32.2    Platelets      150 - 450 x10E9/L 218    RED CELL DISTRIBUTION WIDTH      11.5 - 14.5 % 12.3    Neutrophils %      40.0 - 80.0 % 51.9    Immature Granulocytes %, Automated      0.0 - 0.9 % 0.2    Lymphocytes %      13.0 - 44.0 % 32.5    Monocytes %      2.0 - 10.0 % 9.1    Eosinophils %      0.0 - 6.0 % 5.1    Basophils %      0.0 - 2.0 % 1.2    Neutrophils Absolute      1.20 - 7.70 x10E9/L 2.52    Lymphocytes Absolute      1.20 - 4.80 x10E9/L 1.58    Monocytes Absolute      0.10 - 1.00 x10E9/L 0.44    Eosinophils Absolute      0.00 - 0.70 x10E9/L 0.25    Basophils Absolute      0.00 - 0.10 x10E9/L 0.06    GLUCOSE      74 - 99 mg/dL 87    SODIUM       136 - 145 mmol/L 141    POTASSIUM      3.5 - 5.3 mmol/L 4.1    CHLORIDE      98 - 107 mmol/L 103    Bicarbonate      21 - 32 mmol/L 30    Anion Gap      10 - 20 mmol/L 12    Blood Urea Nitrogen      6 - 23 mg/dL 11    Creatinine      0.50 - 1.05 mg/dL 0.83    GFR Female      >90 mL/min/1.73m2 76    Calcium      8.6 - 10.3 mg/dL 9.9    PHOSPHORUS      2.5 - 4.9 mg/dL 3.5    Albumin      3.4 - 5.0 g/dL 4.3    Hemoglobin A1C      % 5.4    Estimated Average Glucose      MG/    Triiodothyronine, Free      2.3 - 4.2 pg/mL 3.1    Thyroid Stimulating Hormone      0.44 - 3.98 mIU/L 4.51 (H)    Vitamin D, 25-Hydroxy, Total      ng/mL 44    Parathyroid Hormone, Intact      18.5 - 88.0 pg/mL 117.3 (H)    Thyroxine, Free      0.61 - 1.12 ng/dL 0.85         Patient Care Team:  Katiuska Cardenas MD as PCP - General  Katiuska Cardenas MD as PCP - [facility]  Violet Crowell APRN-CNP as Nurse Practitioner (Pulmonary Disease)  Diana Geronimo MD as Referring Physician (Endocrinology)   [facility]    Active Problem List  Patient Active Problem List   Diagnosis    Abnormal screening cardiac CT    Routine adult health maintenance    History of colon polyps    BMI 30.0-30.9,adult    Cardiac risk counseling    Advanced directives, counseling/discussion    Allergic rhinitis    Anxiety    Bilateral sensorineural hearing loss    Chronic obstructive pulmonary disease (CMS/HCC)    Constipation    Current moderate episode of major depressive disorder without prior episode (CMS/HCC)    Coronary artery calcification    Diastolic dysfunction, left ventricle    Disorder of bone density and structure, unspecified    Diverticulosis of colon    Edentulous    Gastroesophageal reflux disease with esophagitis    Grade I hemorrhoids    Liver cyst    Hypertension    Hyperlipidemia    IFG (impaired fasting glucose)    Lung nodule    PAT (paroxysmal atrial tachycardia) (CMS/HCC)    Recurrent herpes labialis    Calculus of  kidney    Renal cyst, right    Secondary hyperparathyroidism, non-renal (CMS/HCC)    TSH elevation    Vitamin D deficiency    Screening for multiple conditions    Screening for colon cancer    Encounter for screening mammogram for breast cancer    Cold sore         Comprehensive Medical/Surgical/Social/Family History  Past Medical History:   Diagnosis Date    Abnormal auditory function study     :  mild to moderate sensorinueural hearing loss in the right ear and a moderate to severe mixed hearing loss in the left. Word recognition was 88% in the right and 32% in the left.    Abnormal screening cardiac CT 2022: 1. Coronary artery calcium score of 328*. (LAD, LCX, RCA, LM) 2. [place] 90th percentile** for age, gender, and race in asymptomatic patients. Coronary Artery  Agatston Score risk:Moderately increased >300 Nuclear stress test (-) 10/21 On statin    H/O bone density study     21: T score -1.9 FRAX* 10-year Probability of Fracture Based on femoral neck BMD: DualFemur (Right) Major Osteoporotic Fracture: 11.1% Hip Fracture:                1.9%    H/O bone density study 2023    LOwest T score -1.7; 10-year Fracture Risk: Major Osteoporotic Fracture  10.3 Hip Fracture                        1.7    H/O cardiovascular stress test     : 1. No nuclear evidence of pharmacologic stress-induced ischemia or scar. 2. Vigorous LV function, EF greater than 75%. 3. Based on these findings low likelihood of obstructive coronary artery disease.    H/O chest x-ray     2017: normal :normal    H/O CT scan of chest     CT Lung screenin/22: 1.  11 mm pleural-based nodular opacity in the right upper lobe which is likely an area of scarring, however recommend follow-up. 2. Moderate upper lung predominant emphysema. 3. Moderate coronary artery calcification. Severe calcification of the thoracic/abdominal aorta. : 1. Right apical pleural thickening is stable/post inflammatory/infectious.     H/O CT scan of chest 11/23/2022    1. Right apical pleural thickening is stable and felt to be post inflammatory/infectious. There are no suspicious parenchymal lung nodules. Recommendations are for continued 1 year low-dose chest CT for surveillance. 2. Severe coronary artery calcifications and correlate with coronary artery disease    H/O diagnostic ultrasound     8/2021 RUQ: No detected cause for patient's symptoms. Liver demonstrates normal size and morphology with a 1 cm simple cyst. 14 mm simple cyst right kidney. 7/1/22: No solid or cystic lesions are identified within the liver on today's examination. . Right nephrolithiasis, 1cm Small right renal cyst, 1.7cm 1/3/23: US kidney: Small cystic lesion suggested within the midpole of the right kidney    H/O diagnostic ultrasound     1/23: US kidney: Small cystic lesion within the midpole of the right kidney is better demonstrated on comparison exam.   Bilateral nephrolithiasis.    H/O Doppler ultrasound     11/21: US aorta (-)    H/O echocardiogram     5/21/18: normal LV size and function, EF 60-65%. Grade 1 diastolic dysfunction. Normal RV size and function. Trace TR.    H/O exercise stress test     5/7/18:no ST or T changes. Duke treadmill score of 7    History of Holter monitoring     ZioPatch 5/7/18 demonstrated a short run of atrial tachycardia. Triggered event corresponded to sinus rhythm.    History of PFTs 08/17/2021    2018: Severe COPD     Past Surgical History:   Procedure Laterality Date    CHOLECYSTECTOMY  08/12/2020    Cholecystectomy    COLONOSCOPY  05/30/2021    2018 diverticulosis in the descending colon, sigmoid colon and  hemorrhoids.    ESOPHAGOGASTRODUODENOSCOPY  05/30/2021    2018: EGD showed a 2 cm hiatal hernia and LA grade A esophagitis.    HYSTERECTOMY  08/06/2021    s/p MARCIO, ovaries remain    MASTOID SURGERY  05/30/2021 7/20/20  Left tympanoplasty, mastoidectomy, posterior atticotomy, facial recess, composite cartilage/ perichondrium  graft.Cultures from OR grew Aspergillus Niger    TYMPANOPLASTY  2021  Left tympanoplasty, mastoidectomy, posterior atticotomy, facial recess, composite cartilage/ perichondrium graft.Cultures from OR grew Aspergillus Niger     Social History     Social History Narrative    , 2 kids    Retired    Former smoker: Quit , Age [age] 1.5 packs/day    No ETOH    ---    Family History:    F; Lung CA ( age 78)    M: Colon CA ( age 70s)    B: AML Leukemia( age 74)    S: Ovarian CA ( 72)    S: CAD ( 69)    No living siblings    D: Janey Noonan    D: Susan Lance     Tobacco/Alcohol/Opioid use, as well as Illicit Drug Use was screened for/reviewed and documented in Social Documentation section of the chart and medication list as appropriate    Allergies and Medications  Lisinopril and Penicillins  Current Outpatient Medications   Medication Instructions    albuterol 90 mcg/actuation inhaler 2 puffs, inhalation, Every 4 hours PRN    ascorbic acid (Vitamin C) 1,000 mg tablet 1 tablet, oral, Daily    cetirizine-pseudoephedrine (ZyrTEC-D) 5-120 mg 12 hr tablet 1 tablet, oral, 2 times daily    cholecalciferol (Vitamin D-3) 50 mcg (2,000 unit) capsule 2 capsules, oral, Daily    cyanocobalamin (Vitamin B-12) 1,000 mcg tablet 1 tablet, oral, Daily    famotidine (PEPCID) 20 mg, oral, 2 times daily    fish oil concentrate (Omega-3) 120-180 mg capsule 1 g, oral, Daily    metoprolol succinate XL (TOPROL-XL) 50 mg, oral, Daily, Do not crush or chew.    omeprazole (PRILOSEC) 40 mg, oral, Daily before breakfast, Do not crush or chew.    rosuvastatin (CRESTOR) 10 mg, oral, Daily    sertraline (ZOLOFT) 50 mg, oral, Daily    Symbicort 160-4.5 mcg/actuation inhaler 2 puffs, inhalation, 2 times daily    valACYclovir (VALTREX) 2,000 mg, oral, 2 times daily     Medications and Supplements  prescribed by me and other practitioners or clinical pharmacist (such as prescriptions, OTC's,  "herbal therapies and supplements) were reviewed and documented in the medical record.      Activities of Daily Living  In your present state of health, do you have any difficulty performing the following activities?:   Preparing food and eating?: No  Bathing yourself: No  Getting dressed: No  Using the toilet:No  Moving around from place to place: No  In the past year have you fallen or had a near fall?:No  Able to manage finances independently: Yes  Able to perform grocery shopping: Yes  Able to manage medications independently: Yes  Able to do housework independently: Yes  Patient self-assessment of health status? Good    Depression Screen  (Note: if answer to either of the following is \"Yes\", then a more complete depression screening is indicated)   Q1: Over the past two weeks, have you felt down, depressed or hopeless? No  Q2: Over the past two weeks, have you felt little interest or pleasure in doing things? No    Current exercise habits: no   Dietary issues discussed: Yes  Hearing difficulties: Yes  Safe in current home environment: Yes  Visual Acuity assessed: Yes  Cognitive Impairment No    Advance directives  Advanced Care Planning (including a Living Will, Healthcare POA, as well as specific end of life choices and/or directives), was discussed for approximately 16 minutes with the patient and/or surrogate, voluntarily, and documented in the Problem List of the medical record.     Cardiac Risk Assessment  Cardiovascular risk was discussed and, if needed, lifestyle modifications recommended, including nutritional choices, exercise, and elimination of habits contributing to risk. We agreed on a plan to reduce the current cardiovascular risk based on above discussion as needed.  Aspirin use/disuse was discussed and documented in the Problem List of the medical record after reviewing the updated guidelines below:    Consider low dose Aspirin ( mg) use if the benefit for cardiovascular disease prevention " "outweighs risk for bleeding complications.   In general, low dose ASA should be considered:  In patients WITHOUT prior MI/stroke/PAD (primary prevention):   a. Age <60: Use if 10-year cardiovascular disease risk >20%, with discussion of risks and benefits with patient  b. Age 60-<70: Use if 10-year cardiovascular disease risk >20% and low bleeding (e.g., gastrointenstinal) risk, with discussion of risks and benefits with patient  c. Age >=70: Do not use    In patients WITH prior MI/stroke/PAD (secondary prevention):   Generally use unless extremely high bleeding (e.g., gastrointenstinal) risk, with discussion of risks and benefits with patient    ROS otherwise negative aside from what was mentioned above in HPI.    Vitals  /76   Pulse 70   Temp 36.7 °C (98 °F)   Ht 1.556 m (5' 1.25\")   Wt 72.8 kg (160 lb 8 oz)   SpO2 95%   BMI 30.08 kg/m²   Body mass index is 30.08 kg/m².  Physical Exam  Gen: Alert, NAD  HEENT:  Unremarkable  Neck:  No BONNIE  Respiratory:  Lungs CTAB  Cardiovascular:  Heart RRR  Neuro:  Gross motor and sensory intact  Skin:  No suspicious lesions present  Breast: No masses, or axillary lymphadenopathy  Gyn: Normal pelvic exam: no uterine masses or cervical lesions, or CMT; no vaginal D/C. No ovarian or adnexal masses; No external vaginal or anal/perineal lesions (Pt declined chaperone)    During the course of the visit the patient was educated and counseled about age appropriate screening and preventive services. Completed preventive screenings were documented in the chart and orders were placed for outstanding screenings/procedures as documented in the Assessment and Plan.    Patient Instructions (the written plan) was given to the patient at check out.  "

## 2023-11-14 ENCOUNTER — ANCILLARY PROCEDURE (OUTPATIENT)
Dept: RADIOLOGY | Facility: CLINIC | Age: 68
End: 2023-11-14
Payer: MEDICARE

## 2023-11-14 DIAGNOSIS — R91.1 SOLITARY PULMONARY NODULE: ICD-10-CM

## 2023-11-14 PROCEDURE — 71271 CT THORAX LUNG CANCER SCR C-: CPT

## 2023-11-14 PROCEDURE — 71271 CT THORAX LUNG CANCER SCR C-: CPT | Performed by: RADIOLOGY

## 2023-11-20 ENCOUNTER — TELEMEDICINE (OUTPATIENT)
Dept: PULMONOLOGY | Facility: HOSPITAL | Age: 68
End: 2023-11-20
Payer: MEDICARE

## 2023-11-20 ASSESSMENT — ENCOUNTER SYMPTOMS
SINUS PRESSURE: 0
AGITATION: 0
DIARRHEA: 0
RHINORRHEA: 0
VOMITING: 0
NUMBNESS: 0
FATIGUE: 0
NERVOUS/ANXIOUS: 0
PALPITATIONS: 0
NAUSEA: 0
ARTHRALGIAS: 0
FEVER: 0
ABDOMINAL PAIN: 0
DIZZINESS: 0
EYE PAIN: 0
VOICE CHANGE: 0
BACK PAIN: 0
WEAKNESS: 0
MYALGIAS: 0
HEADACHES: 0
JOINT SWELLING: 0

## 2023-11-20 NOTE — PROGRESS NOTES
Patient: Karolyn Martinez    [id number]  : 1955 -- AGE 68 y.o.    Provider: PRAVEEN Guardado-CNP     Location [facility]   Service Date: 2023              [facility]  Follow up visit note      HISTORY OF PRESENT ILLNESS       HISTORY OF PRESENT ILLNESS     On today's visit, the patient reports ***    HPI:  cough   wheezing   VICKERS   SOB at rest   allergies   GERD   chest pain   snoring     Ms. Salazar is a 68 year old CF (Former smoker ~55 pack years) here for follow up for COPD and abnormal CT chest. Last seen in clinic on 23.    PCP: Dr. Cardenas      23: Since last visit she has been doing well. She was able to get the symbicort she and used for a month - she did not notice much improvement in her breathing. Her  did notice improvement in her wheezing. She has not been using it. 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 She does not have have a nebulizer machine. She does not notice her breathing limiting her ability to do day to day tasks - runs errands without issue. Cough has been doing better - just better in general. She has been off the symbicort for a time. She lost weight and feels this is helping with her breathing. She states cough is mostly dry - states not coughing often enough to describe it. Her  will notice wheezing with exertion. She has VICKERS with going up the stairs. She denies any SOB at rest or CP. She has been having a runny nose and post nasal drip - she feels the pollen has been setting off her allergies She has used OTC eye drops but no pills. She takes pepcid PRN - not needing often.   CAT today 17      23: She states the older she gets the more she has VICKERS. She has PRN albuterol - does not use it often. Maybe once a week. She has VICKERS with at times walking around the house. She has noticed emotional situations also make her SOB. She has  VICKERS with going up the stairs - sometimes she has to stop other times she can just push through it. Her  notices wheezing with exertion - she states she has hearing aids and can't hear it. She has an occasional dry cough. She has congestion/ post nasal drip in the morning that will cause a cough. Mucous in her throat is more bothersome in the morning. She states at times she will cough up clear mucous - can at times be light green. She denies any SOB at rest or CP. She has bothersome GERD symptoms, but does not take her meds with regularity. She states she does not take medications regularly. She notices GERD when she bends over. She will eat small meals to prevent symptoms. She notices symptoms more during the day.      Previous pulmonary history: She was previously told she has COPD. She currently is on no supplemental oxygen. She has never been to pulmonary rehab. Does not recall having AECOPD requiring antibiotics or prednisone.     Inhalers/nebulized medications: PRN albuterol      Hospitalization History: She has not been hospitalized over the last year for breathing related problem.     Sleep history: Rare snoring. Does not wake up feeling rested. She has trouble staying asleep - wakes up frequently.      ALLERGIES AND MEDICATIONS     ALLERGIES  Allergies   Allergen Reactions    Lisinopril Rash    Penicillins Rash       MEDICATIONS  Current Outpatient Medications   Medication Sig Dispense Refill    albuterol 90 mcg/actuation inhaler Inhale 2 puffs every 4 hours if needed for shortness of breath or wheezing.      ascorbic acid (Vitamin C) 1,000 mg tablet Take 1 tablet (1,000 mg) by mouth once daily.      cetirizine-pseudoephedrine (ZyrTEC-D) 5-120 mg 12 hr tablet Take 1 tablet by mouth 2 times a day.      cholecalciferol (Vitamin D-3) 50 mcg (2,000 unit) capsule Take 2 capsules (100 mcg) by mouth early in the morning..      cyanocobalamin (Vitamin B-12) 1,000 mcg tablet Take 1 tablet (1,000 mcg) by mouth  once daily.      famotidine (Pepcid) 20 mg tablet Take 1 tablet (20 mg) by mouth 2 times a day. 180 tablet 3    fish oil concentrate (Omega-3) 120-180 mg capsule Take 1 capsule (1 g) by mouth once daily.      metoprolol succinate XL (Toprol-XL) 50 mg 24 hr tablet Take 1 tablet (50 mg) by mouth once daily. Do not crush or chew. 90 tablet 3    omeprazole (PriLOSEC) 40 mg DR capsule Take 1 capsule (40 mg) by mouth once daily in the morning. Take before meals. Do not crush or chew. 90 capsule 3    rosuvastatin (Crestor) 10 mg tablet Take 1 tablet (10 mg) by mouth once daily. 90 tablet 3    sertraline (Zoloft) 50 mg tablet Take 1 tablet (50 mg) by mouth once daily. 90 tablet 3    Symbicort 160-4.5 mcg/actuation inhaler Inhale 2 puffs 2 times a day.      valACYclovir (Valtrex) 1 gram tablet Take 2 tablets (2,000 mg) by mouth 2 times a day. 12 tablet 3     No current facility-administered medications for this visit.         PAST HISTORY     PAST MEDICAL HISTORY  - HTN   - palpitations - evaluated by cardiology   - anxiety   - HLD   - GERD   - COPD  - hearing loss - hearing aides      PAST SURGICAL HISTORY  Past Surgical History:   Procedure Laterality Date    CHOLECYSTECTOMY  08/12/2020    Cholecystectomy    COLONOSCOPY  05/30/2021    2018 diverticulosis in the descending colon, sigmoid colon and  hemorrhoids.    ESOPHAGOGASTRODUODENOSCOPY  05/30/2021    2018: EGD showed a 2 cm hiatal hernia and LA grade A esophagitis.    HYSTERECTOMY  08/06/2021    s/p MARCIO, ovaries remain    MASTOID SURGERY  05/30/2021 7/20/20  Left tympanoplasty, mastoidectomy, posterior atticotomy, facial recess, composite cartilage/ perichondrium graft.Cultures from OR grew Aspergillus Niger    TYMPANOPLASTY  05/30/2021 7/20/20  Left tympanoplasty, mastoidectomy, posterior atticotomy, facial recess, composite cartilage/ perichondrium graft.Cultures from OR grew Aspergillus Niger       IMMUNIZATION HISTORY  Immunization History   Administered  Date(s) Administered    Influenza, High Dose Seasonal, Preservative Free 2018    Influenza, Seasonal, Quadrivalent, Adjuvanted 10/26/2020, 2021    Influenza, seasonal, injectable 10/18/2014    Pfizer Gray Cap SARS-CoV-2 2022    Pfizer Purple Cap SARS-CoV-2 2021, 2021, 2021    Pneumococcal conjugate vaccine, 13-valent (PREVNAR 13) 2018    Pneumococcal conjugate vaccine, 20-valent (PREVNAR 20) 2023    Pneumococcal polysaccharide vaccine, 23-valent, age 2 years and older (PNEUMOVAX 23) 2020    Td vaccine, age 7 years and older (TDVAX) 2021    Tdap vaccine, age 7 year and older (BOOSTRIX) 2012    Zoster vaccine, recombinant, adult (SHINGRIX) 10/26/2020, 2021       SOCIAL HISTORY  smokin-56 1.5 ppd ~55 pack years  drinking: none  illicit drug use: none    OCCUPATIONAL/ENVIRONMENTAL HISTORY  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      FAMILY HISTORY  Family History: Dad with lung cancer. Sisters x 2 - on oxygen related COPD. Brother ? GROVER. Mother with colon cancer.        RESULTS/DATA     Pulmonary Function Test Results   PFTs:   - 17 - FEV1/FVC 0.37/ FEV1 0.95 (44% - almost BD resp)/ FVC 2.53 (90% + BD resp)/ TLC 6.25 (135%)/ DLCO 58%   - 18 - FEV1/FVC 0.38/ FEV1 0.78 (36% no BD resp)/ FVC 2.13 (76%)/ TLC 6.03 (131%)/ DLCO 51%   - 23 - FEV1/FVC 0.33/ FEV1 0.67 (32% almost BD)/ FVC 1.94 (73%)/ TLC 13.82 ?? (292%)/ DLCO 39%            Chest Radiograph     CHEST 2 VIEW 2020 - Impression  No focal infiltrate or pneumothorax.      Chest CT Scan     - 22 - 11 mm pleural-based nodular opacity in the right upper lobe which is likely an area of scarring, however recommend follow-up chest CT in 3 months to confirm stability. Moderate upper lung predominant emphysema.  Moderate coronary artery calcification. Severe calcification of the thoracic and abdominal aorta.  - 22 - Right apical pleural  thickening is stable and felt to be post inflammatory/infectious. There are no suspicious parenchymal lung nodules. Recommendations are for continued 1 year low-dose chest CT for surveillance. Severe coronary artery calcifications and correlate with coronary artery disease risk factors     CT lung screening low dose 11/14/2023  Impression  1. Stable asymmetric postinflammatory scarring in the posterior right  apex and stable presumably benign tiny nodule of the subpleural right  lateral lung base dating back to the baseline 08/23/2022 exam. Advise  ongoing annual low-dose lung cancer screening CT.  2. At least moderate, scattered atherosclerotic changes, including  calcification seen in the coronary arteries. A nuclear medicine  stress test was performed 09/03/2021 per chart review. See that  result and obtain follow-up as clinically appropriate.  3. 6 mm nonobstructing right renal calculus which was present on the  baseline 08/23/2022 examination, but motion blurred, limiting direct  Comparison    Stress test: 9/3/21- No nuclear evidence of pharmacologic stress-induced ischemia or  scar. 2. Vigorous LV function, EF greater than 75%. 3. Based on these findings low likelihood of obstructive coronary  artery disease.       Echocardiogram     Echo: 5/21/18-  EF 60-65%, Grade I diastolic dysfunction. RA normal size, normal RV size and function. Trace TR.       Labs/ Other testing        REVIEW OF SYSTEMS     REVIEW OF SYSTEMS  Review of Systems   Constitutional:  Negative for fatigue and fever.   HENT:  Negative for congestion, postnasal drip, rhinorrhea, sinus pressure and voice change.    Eyes:  Negative for pain and visual disturbance.   Cardiovascular:  Negative for chest pain, palpitations and leg swelling.   Gastrointestinal:  Negative for abdominal pain, diarrhea, nausea and vomiting.   Endocrine: Negative for cold intolerance and heat intolerance.   Musculoskeletal:  Negative for arthralgias, back pain, joint  swelling and myalgias.   Skin:  Negative for rash.   Neurological:  Negative for dizziness, weakness, numbness and headaches.   Psychiatric/Behavioral:  Negative for agitation. The patient is not nervous/anxious.          PHYSICAL EXAM     VITAL SIGNS: There were no vitals taken for this visit.     CURRENT WEIGHT: [unfilled]  BMI: [unfilled]  PREVIOUS WEIGHTS:  Wt Readings from Last 3 Encounters:   09/20/23 72.7 kg (160 lb 4.4 oz)   08/21/23 72.8 kg (160 lb 8 oz)   06/15/23 73.9 kg (163 lb)       Physical Exam - exam not completed - virtual visit     ASSESSMENT/PLAN     1. COPD: former smoker - PFTs with severe obstruction in 2018 - BD resp.   - continue symbicort 160 2 puffs twice daily - rinse mouth out afterwards- any issues obtaining this call my nurse   - continue albuterol HFA inhaler 2 puffs every 4-6 hours as needed for shortness of breath      2. Former smoker: quit smoking 12 years ago   - lung cancer screening in 11/2023      3. Allergic rhinitis:   - start loratadine (claritin) 10mg daily for allergy symptoms

## 2023-11-20 NOTE — PROGRESS NOTES
Patient: Karolyn Martinez    [id number]  : 1955 -- AGE 68 y.o.    Provider: PRAVEEN Guardado-CNP     Location [facility]   Service Date: 2023              [facility]  Follow up visit note      HISTORY OF PRESENT ILLNESS       HISTORY OF PRESENT ILLNESS     On today's visit, the patient reports ***    HPI:  cough   wheezing   VICKERS   SOB at rest   allergies   GERD   chest pain   snoring     Ms. Salazar is a 68 year old CF (Former smoker ~55 pack years) here for follow up for COPD and abnormal CT chest. Last seen in clinic on 23.    PCP: Dr. Cardenas      23: Since last visit she has been doing well. She was able to get the symbicort she and used for a month - she did not notice much improvement in her breathing. Her  did notice improvement in her wheezing. She has not been using it. 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 She does not have have a nebulizer machine. She does not notice her breathing limiting her ability to do day to day tasks - runs errands without issue. Cough has been doing better - just better in general. She has been off the symbicort for a time. She lost weight and feels this is helping with her breathing. She states cough is mostly dry - states not coughing often enough to describe it. Her  will notice wheezing with exertion. She has VICKERS with going up the stairs. She denies any SOB at rest or CP. She has been having a runny nose and post nasal drip - she feels the pollen has been setting off her allergies She has used OTC eye drops but no pills. She takes pepcid PRN - not needing often.   CAT today 17      23: She states the older she gets the more she has VICKERS. She has PRN albuterol - does not use it often. Maybe once a week. She has VICKERS with at times walking around the house. She has noticed emotional situations also make her SOB. She has  VICKERS with going up the stairs - sometimes she has to stop other times she can just push through it. Her  notices wheezing with exertion - she states she has hearing aids and can't hear it. She has an occasional dry cough. She has congestion/ post nasal drip in the morning that will cause a cough. Mucous in her throat is more bothersome in the morning. She states at times she will cough up clear mucous - can at times be light green. She denies any SOB at rest or CP. She has bothersome GERD symptoms, but does not take her meds with regularity. She states she does not take medications regularly. She notices GERD when she bends over. She will eat small meals to prevent symptoms. She notices symptoms more during the day.      Previous pulmonary history: She was previously told she has COPD. She currently is on no supplemental oxygen. She has never been to pulmonary rehab. Does not recall having AECOPD requiring antibiotics or prednisone.     Inhalers/nebulized medications: PRN albuterol      Hospitalization History: She has not been hospitalized over the last year for breathing related problem.     Sleep history: Rare snoring. Does not wake up feeling rested. She has trouble staying asleep - wakes up frequently.      ALLERGIES AND MEDICATIONS     ALLERGIES  Allergies   Allergen Reactions    Lisinopril Rash    Penicillins Rash       MEDICATIONS  Current Outpatient Medications   Medication Sig Dispense Refill    albuterol 90 mcg/actuation inhaler Inhale 2 puffs every 4 hours if needed for shortness of breath or wheezing.      ascorbic acid (Vitamin C) 1,000 mg tablet Take 1 tablet (1,000 mg) by mouth once daily.      cetirizine-pseudoephedrine (ZyrTEC-D) 5-120 mg 12 hr tablet Take 1 tablet by mouth 2 times a day.      cholecalciferol (Vitamin D-3) 50 mcg (2,000 unit) capsule Take 2 capsules (100 mcg) by mouth early in the morning..      cyanocobalamin (Vitamin B-12) 1,000 mcg tablet Take 1 tablet (1,000 mcg) by mouth  once daily.      famotidine (Pepcid) 20 mg tablet Take 1 tablet (20 mg) by mouth 2 times a day. 180 tablet 3    fish oil concentrate (Omega-3) 120-180 mg capsule Take 1 capsule (1 g) by mouth once daily.      metoprolol succinate XL (Toprol-XL) 50 mg 24 hr tablet Take 1 tablet (50 mg) by mouth once daily. Do not crush or chew. 90 tablet 3    omeprazole (PriLOSEC) 40 mg DR capsule Take 1 capsule (40 mg) by mouth once daily in the morning. Take before meals. Do not crush or chew. 90 capsule 3    rosuvastatin (Crestor) 10 mg tablet Take 1 tablet (10 mg) by mouth once daily. 90 tablet 3    sertraline (Zoloft) 50 mg tablet Take 1 tablet (50 mg) by mouth once daily. 90 tablet 3    Symbicort 160-4.5 mcg/actuation inhaler Inhale 2 puffs 2 times a day.      valACYclovir (Valtrex) 1 gram tablet Take 2 tablets (2,000 mg) by mouth 2 times a day. 12 tablet 3     No current facility-administered medications for this visit.         PAST HISTORY     PAST MEDICAL HISTORY  - HTN   - palpitations - evaluated by cardiology   - anxiety   - HLD   - GERD   - COPD  - hearing loss - hearing aides      PAST SURGICAL HISTORY  Past Surgical History:   Procedure Laterality Date    CHOLECYSTECTOMY  08/12/2020    Cholecystectomy    COLONOSCOPY  05/30/2021    2018 diverticulosis in the descending colon, sigmoid colon and  hemorrhoids.    ESOPHAGOGASTRODUODENOSCOPY  05/30/2021    2018: EGD showed a 2 cm hiatal hernia and LA grade A esophagitis.    HYSTERECTOMY  08/06/2021    s/p MARCIO, ovaries remain    MASTOID SURGERY  05/30/2021 7/20/20  Left tympanoplasty, mastoidectomy, posterior atticotomy, facial recess, composite cartilage/ perichondrium graft.Cultures from OR grew Aspergillus Niger    TYMPANOPLASTY  05/30/2021 7/20/20  Left tympanoplasty, mastoidectomy, posterior atticotomy, facial recess, composite cartilage/ perichondrium graft.Cultures from OR grew Aspergillus Niger       IMMUNIZATION HISTORY  Immunization History   Administered  Date(s) Administered    Influenza, High Dose Seasonal, Preservative Free 2018    Influenza, Seasonal, Quadrivalent, Adjuvanted 10/26/2020, 2021    Influenza, seasonal, injectable 10/18/2014    Pfizer Gray Cap SARS-CoV-2 2022    Pfizer Purple Cap SARS-CoV-2 2021, 2021, 2021    Pneumococcal conjugate vaccine, 13-valent (PREVNAR 13) 2018    Pneumococcal conjugate vaccine, 20-valent (PREVNAR 20) 2023    Pneumococcal polysaccharide vaccine, 23-valent, age 2 years and older (PNEUMOVAX 23) 2020    Td vaccine, age 7 years and older (TDVAX) 2021    Tdap vaccine, age 7 year and older (BOOSTRIX) 2012    Zoster vaccine, recombinant, adult (SHINGRIX) 10/26/2020, 2021       SOCIAL HISTORY  smokin-56 1.5 ppd ~55 pack years  drinking: none  illicit drug use: none    OCCUPATIONAL/ENVIRONMENTAL HISTORY  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      FAMILY HISTORY  Family History: Dad with lung cancer. Sisters x 2 - on oxygen related COPD. Brother ? GROVER. Mother with colon cancer.        RESULTS/DATA     Pulmonary Function Test Results   PFTs:   - 17 - FEV1/FVC 0.37/ FEV1 0.95 (44% - almost BD resp)/ FVC 2.53 (90% + BD resp)/ TLC 6.25 (135%)/ DLCO 58%   - 18 - FEV1/FVC 0.38/ FEV1 0.78 (36% no BD resp)/ FVC 2.13 (76%)/ TLC 6.03 (131%)/ DLCO 51%   - 23 - FEV1/FVC 0.33/ FEV1 0.67 (32% almost BD)/ FVC 1.94 (73%)/ TLC 13.82 ?? (292%)/ DLCO 39%            Chest Radiograph     CHEST 2 VIEW 2020 - Impression  No focal infiltrate or pneumothorax.      Chest CT Scan     - 22 - 11 mm pleural-based nodular opacity in the right upper lobe which is likely an area of scarring, however recommend follow-up chest CT in 3 months to confirm stability. Moderate upper lung predominant emphysema.  Moderate coronary artery calcification. Severe calcification of the thoracic and abdominal aorta.  - 22 - Right apical pleural  thickening is stable and felt to be post inflammatory/infectious. There are no suspicious parenchymal lung nodules. Recommendations are for continued 1 year low-dose chest CT for surveillance. Severe coronary artery calcifications and correlate with coronary artery disease risk factors     CT lung screening low dose 11/14/2023  Impression  1. Stable asymmetric postinflammatory scarring in the posterior right  apex and stable presumably benign tiny nodule of the subpleural right  lateral lung base dating back to the baseline 08/23/2022 exam. Advise  ongoing annual low-dose lung cancer screening CT.  2. At least moderate, scattered atherosclerotic changes, including  calcification seen in the coronary arteries. A nuclear medicine  stress test was performed 09/03/2021 per chart review. See that  result and obtain follow-up as clinically appropriate.  3. 6 mm nonobstructing right renal calculus which was present on the  baseline 08/23/2022 examination, but motion blurred, limiting direct  Comparison    Stress test: 9/3/21- No nuclear evidence of pharmacologic stress-induced ischemia or  scar. 2. Vigorous LV function, EF greater than 75%. 3. Based on these findings low likelihood of obstructive coronary  artery disease.       Echocardiogram     Echo: 5/21/18-  EF 60-65%, Grade I diastolic dysfunction. RA normal size, normal RV size and function. Trace TR.       Labs/ Other testing        REVIEW OF SYSTEMS     REVIEW OF SYSTEMS  Review of Systems   Constitutional:  Negative for fatigue and fever.   HENT:  Negative for congestion, postnasal drip, rhinorrhea, sinus pressure and voice change.    Eyes:  Negative for pain and visual disturbance.   Cardiovascular:  Negative for chest pain, palpitations and leg swelling.   Gastrointestinal:  Negative for abdominal pain, diarrhea, nausea and vomiting.   Endocrine: Negative for cold intolerance and heat intolerance.   Musculoskeletal:  Negative for arthralgias, back pain, joint  swelling and myalgias.   Skin:  Negative for rash.   Neurological:  Negative for dizziness, weakness, numbness and headaches.   Psychiatric/Behavioral:  Negative for agitation. The patient is not nervous/anxious.          PHYSICAL EXAM     VITAL SIGNS: There were no vitals taken for this visit.     CURRENT WEIGHT: [unfilled]  BMI: [unfilled]  PREVIOUS WEIGHTS:  Wt Readings from Last 3 Encounters:   09/20/23 72.7 kg (160 lb 4.4 oz)   08/21/23 72.8 kg (160 lb 8 oz)   06/15/23 73.9 kg (163 lb)       Physical Exam - exam not completed - virtual visit     ASSESSMENT/PLAN     1. COPD: former smoker - PFTs with severe obstruction in 2018 - BD resp.   - continue symbicort 160 2 puffs twice daily - rinse mouth out afterwards- any issues obtaining this call my nurse   - continue albuterol HFA inhaler 2 puffs every 4-6 hours as needed for shortness of breath      2. Former smoker: quit smoking 12 years ago   - lung cancer screening in 11/2023      3. Allergic rhinitis:   - start loratadine (claritin) 10mg daily for allergy symptoms

## 2023-12-09 DIAGNOSIS — I47.19 PAT (PAROXYSMAL ATRIAL TACHYCARDIA) (CMS-HCC): ICD-10-CM

## 2023-12-10 RX ORDER — METOPROLOL SUCCINATE 50 MG/1
50 TABLET, EXTENDED RELEASE ORAL DAILY
Qty: 90 TABLET | Refills: 3 | Status: SHIPPED | OUTPATIENT
Start: 2023-12-10 | End: 2024-02-15 | Stop reason: ALTCHOICE

## 2024-02-02 ENCOUNTER — APPOINTMENT (OUTPATIENT)
Dept: PRIMARY CARE | Facility: CLINIC | Age: 69
End: 2024-02-02
Payer: MEDICARE

## 2024-02-15 ENCOUNTER — OFFICE VISIT (OUTPATIENT)
Dept: PRIMARY CARE | Facility: CLINIC | Age: 69
End: 2024-02-15
Payer: MEDICARE

## 2024-02-15 ENCOUNTER — LAB (OUTPATIENT)
Dept: LAB | Facility: LAB | Age: 69
End: 2024-02-15
Payer: MEDICARE

## 2024-02-15 VITALS
SYSTOLIC BLOOD PRESSURE: 121 MMHG | DIASTOLIC BLOOD PRESSURE: 79 MMHG | OXYGEN SATURATION: 96 % | HEART RATE: 86 BPM | TEMPERATURE: 98.6 F | BODY MASS INDEX: 27.56 KG/M2 | HEIGHT: 61 IN | WEIGHT: 146 LBS

## 2024-02-15 DIAGNOSIS — R90.89 ABNORMAL BRAIN MRI: ICD-10-CM

## 2024-02-15 DIAGNOSIS — E78.2 MIXED HYPERLIPIDEMIA: ICD-10-CM

## 2024-02-15 DIAGNOSIS — J44.9 CHRONIC OBSTRUCTIVE PULMONARY DISEASE, UNSPECIFIED COPD TYPE (MULTI): ICD-10-CM

## 2024-02-15 DIAGNOSIS — R93.1 ABNORMAL SCREENING CARDIAC CT: ICD-10-CM

## 2024-02-15 DIAGNOSIS — E55.9 VITAMIN D DEFICIENCY: ICD-10-CM

## 2024-02-15 DIAGNOSIS — R41.9 COGNITIVE COMPLAINTS: Primary | ICD-10-CM

## 2024-02-15 DIAGNOSIS — Z86.73 H/O: STROKE: ICD-10-CM

## 2024-02-15 DIAGNOSIS — F32.1 CURRENT MODERATE EPISODE OF MAJOR DEPRESSIVE DISORDER WITHOUT PRIOR EPISODE (MULTI): ICD-10-CM

## 2024-02-15 DIAGNOSIS — R41.9 COGNITIVE COMPLAINTS: ICD-10-CM

## 2024-02-15 DIAGNOSIS — E21.1 SECONDARY HYPERPARATHYROIDISM, NON-RENAL (MULTI): ICD-10-CM

## 2024-02-15 DIAGNOSIS — F41.9 ANXIETY: ICD-10-CM

## 2024-02-15 DIAGNOSIS — I47.19 PAT (PAROXYSMAL ATRIAL TACHYCARDIA) (CMS-HCC): ICD-10-CM

## 2024-02-15 DIAGNOSIS — Z86.79 H/O: HYPERTENSION: ICD-10-CM

## 2024-02-15 PROBLEM — I25.10 CORONARY ARTERY CALCIFICATION: Status: RESOLVED | Noted: 2023-07-23 | Resolved: 2024-02-15

## 2024-02-15 PROBLEM — B00.1 COLD SORE: Status: RESOLVED | Noted: 2023-08-21 | Resolved: 2024-02-15

## 2024-02-15 PROBLEM — I25.84 CORONARY ARTERY CALCIFICATION: Status: RESOLVED | Noted: 2023-07-23 | Resolved: 2024-02-15

## 2024-02-15 LAB
25(OH)D3 SERPL-MCNC: 41 NG/ML (ref 30–100)
ALBUMIN SERPL BCP-MCNC: 4.6 G/DL (ref 3.4–5)
ALP SERPL-CCNC: 51 U/L (ref 33–136)
ALT SERPL W P-5'-P-CCNC: 17 U/L (ref 7–45)
ANION GAP SERPL CALC-SCNC: 11 MMOL/L (ref 10–20)
AST SERPL W P-5'-P-CCNC: 20 U/L (ref 9–39)
BASOPHILS # BLD AUTO: 0.04 X10*3/UL (ref 0–0.1)
BASOPHILS NFR BLD AUTO: 0.8 %
BILIRUB SERPL-MCNC: 0.6 MG/DL (ref 0–1.2)
BUN SERPL-MCNC: 12 MG/DL (ref 6–23)
CA-I BLD-SCNC: 1.27 MMOL/L (ref 1.1–1.33)
CALCIUM SERPL-MCNC: 10.2 MG/DL (ref 8.6–10.3)
CHLORIDE SERPL-SCNC: 104 MMOL/L (ref 98–107)
CHOLEST SERPL-MCNC: 198 MG/DL (ref 0–199)
CHOLESTEROL/HDL RATIO: 3.3
CO2 SERPL-SCNC: 31 MMOL/L (ref 21–32)
CREAT SERPL-MCNC: 0.87 MG/DL (ref 0.5–1.05)
EGFRCR SERPLBLD CKD-EPI 2021: 71 ML/MIN/1.73M*2
EOSINOPHIL # BLD AUTO: 0.15 X10*3/UL (ref 0–0.4)
EOSINOPHIL NFR BLD AUTO: 3 %
ERYTHROCYTE [DISTWIDTH] IN BLOOD BY AUTOMATED COUNT: 12.3 % (ref 11.5–14.5)
FOLATE SERPL-MCNC: 9 NG/ML
GLUCOSE SERPL-MCNC: 107 MG/DL (ref 74–99)
HCT VFR BLD AUTO: 44.1 % (ref 36–46)
HDLC SERPL-MCNC: 60.4 MG/DL
HGB BLD-MCNC: 14.3 G/DL (ref 12–16)
IMM GRANULOCYTES # BLD AUTO: 0.01 X10*3/UL (ref 0–0.5)
IMM GRANULOCYTES NFR BLD AUTO: 0.2 % (ref 0–0.9)
LDLC SERPL CALC-MCNC: 123 MG/DL
LYMPHOCYTES # BLD AUTO: 1.14 X10*3/UL (ref 0.8–3)
LYMPHOCYTES NFR BLD AUTO: 22.8 %
MCH RBC QN AUTO: 30.9 PG (ref 26–34)
MCHC RBC AUTO-ENTMCNC: 32.4 G/DL (ref 32–36)
MCV RBC AUTO: 95 FL (ref 80–100)
MONOCYTES # BLD AUTO: 0.44 X10*3/UL (ref 0.05–0.8)
MONOCYTES NFR BLD AUTO: 8.8 %
NEUTROPHILS # BLD AUTO: 3.23 X10*3/UL (ref 1.6–5.5)
NEUTROPHILS NFR BLD AUTO: 64.4 %
NON HDL CHOLESTEROL: 138 MG/DL (ref 0–149)
NRBC BLD-RTO: 0 /100 WBCS (ref 0–0)
PLATELET # BLD AUTO: 204 X10*3/UL (ref 150–450)
POTASSIUM SERPL-SCNC: 5.1 MMOL/L (ref 3.5–5.3)
PROT SERPL-MCNC: 6.7 G/DL (ref 6.4–8.2)
PTH-INTACT SERPL-MCNC: 104.9 PG/ML (ref 18.5–88)
RBC # BLD AUTO: 4.63 X10*6/UL (ref 4–5.2)
SODIUM SERPL-SCNC: 141 MMOL/L (ref 136–145)
TRIGL SERPL-MCNC: 73 MG/DL (ref 0–149)
TSH SERPL-ACNC: 3.06 MIU/L (ref 0.44–3.98)
VIT B12 SERPL-MCNC: 289 PG/ML (ref 211–911)
VLDL: 15 MG/DL (ref 0–40)
WBC # BLD AUTO: 5 X10*3/UL (ref 4.4–11.3)

## 2024-02-15 PROCEDURE — 84443 ASSAY THYROID STIM HORMONE: CPT

## 2024-02-15 PROCEDURE — 36415 COLL VENOUS BLD VENIPUNCTURE: CPT

## 2024-02-15 PROCEDURE — 83970 ASSAY OF PARATHORMONE: CPT

## 2024-02-15 PROCEDURE — 1159F MED LIST DOCD IN RCRD: CPT | Performed by: INTERNAL MEDICINE

## 2024-02-15 PROCEDURE — 82330 ASSAY OF CALCIUM: CPT

## 2024-02-15 PROCEDURE — 1123F ACP DISCUSS/DSCN MKR DOCD: CPT | Performed by: INTERNAL MEDICINE

## 2024-02-15 PROCEDURE — 82607 VITAMIN B-12: CPT

## 2024-02-15 PROCEDURE — 1036F TOBACCO NON-USER: CPT | Performed by: INTERNAL MEDICINE

## 2024-02-15 PROCEDURE — 1160F RVW MEDS BY RX/DR IN RCRD: CPT | Performed by: INTERNAL MEDICINE

## 2024-02-15 PROCEDURE — 99215 OFFICE O/P EST HI 40 MIN: CPT | Performed by: INTERNAL MEDICINE

## 2024-02-15 PROCEDURE — 1158F ADVNC CARE PLAN TLK DOCD: CPT | Performed by: INTERNAL MEDICINE

## 2024-02-15 PROCEDURE — 85025 COMPLETE CBC W/AUTO DIFF WBC: CPT

## 2024-02-15 PROCEDURE — 80053 COMPREHEN METABOLIC PANEL: CPT

## 2024-02-15 PROCEDURE — 80061 LIPID PANEL: CPT

## 2024-02-15 PROCEDURE — 82746 ASSAY OF FOLIC ACID SERUM: CPT

## 2024-02-15 PROCEDURE — 82306 VITAMIN D 25 HYDROXY: CPT

## 2024-02-15 PROCEDURE — 1125F AMNT PAIN NOTED PAIN PRSNT: CPT | Performed by: INTERNAL MEDICINE

## 2024-02-15 RX ORDER — SERTRALINE HYDROCHLORIDE 50 MG/1
50 TABLET, FILM COATED ORAL DAILY
Qty: 90 TABLET | Refills: 3 | Status: SHIPPED | OUTPATIENT
Start: 2024-02-15 | End: 2024-03-19 | Stop reason: SDUPTHER

## 2024-02-15 ASSESSMENT — MINI MENTAL STATE EXAM
NAME OR REPEAT 3 OBJECTS - (APPLE, TABLE, PENNY) OR (BALL, TREE, FLAG): 3 CORRECT
SHOW: PENCIL [OBJECT] ASK: WHAT IS THIS CALLED?: 2 CORRECT
WHAT STATE, COUNTRY, CITY, HOSPITAL, FLOOR: 5 CORRECT
HAND THE PERSON A PENCIL AND PAPER. SAY:  WRITE ANY COMPLETE SENTENCE ON THAT PIECE OF PAPER. (NOTE: THE SENTENCE MUST MAKE SENSE.  IGNORE SPELLING ERRORS): 1 CORRECT
SAY:  READ THE WORDS ON THE PAGE AND THEN DO WHAT IT SAYS.  THEN HAND THE PERSON THE SHEET WITH CLOSE YOUR EYES ON IT.  IF THE SUBJECT READS AND DOES NOT CLOSE THEIR EYES, REPEAT UP TO THREE TIMES.  SCORE ONLY IF SUBJECT CLOSES EYES.: 3 CORRECT
PLEASE COPY THIS PICTURE (NOTE ALL 10 ANGLES MUST BE PRESENT AND TWO MUST INTERSECT): 1 CORRECT
PLACE DESIGN, ERASER AND PENCIL IN FRONT OF THE PERSON.  SAY:  COPY THIS DESIGN PLEASE.  SHOW: DESIGN. ALLOW: MULTIPLE TRIES. WAIT UNTIL PERSON IS FINISHED AND HANDS IT BACK. SCORE: ONLY FOR DIAGRAM WITH 4-SIDED FIGURE BETWEEN TWO 5-SIDED FIGURES: 1 CORRECT
SAY: I WOULD LIKE YOU TO REPEAT THIS PHRASE AFTER ME: NO IFS, ANDS, OR BUTS.: 1 CORRECT
RECALL THE 3 OBJECTS FROM ABOVE (APPLE, TABLE, PENNY) OR (BALL, TREE, FLAG): 0 CORRECT / UNABLE TO SCORE
SPELL THE WORD WORLD FORWARD AND BACKWARDS OR SERIAL 7S: 5 CORRECT
WHAT IS THE YEAR, SEASON, DATE, DAY, AND MONTH: 5 CORRECT
SUM ALL MMSE QUESTIONS FOR TOTAL SCORE [OUT OF 30].: 27

## 2024-02-15 ASSESSMENT — PATIENT HEALTH QUESTIONNAIRE - PHQ9
1. LITTLE INTEREST OR PLEASURE IN DOING THINGS: NOT AT ALL
2. FEELING DOWN, DEPRESSED OR HOPELESS: MORE THAN HALF THE DAYS
10. IF YOU CHECKED OFF ANY PROBLEMS, HOW DIFFICULT HAVE THESE PROBLEMS MADE IT FOR YOU TO DO YOUR WORK, TAKE CARE OF THINGS AT HOME, OR GET ALONG WITH OTHER PEOPLE: NOT DIFFICULT AT ALL
SUM OF ALL RESPONSES TO PHQ9 QUESTIONS 1 AND 2: 2

## 2024-02-15 NOTE — ASSESSMENT & PLAN NOTE
Will order labs and urinary studies Endo may have wanted  FYI to her as there was no f/up after last appt  ? If PTH elevation causing some of her psychiatric symptoms?
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
## 2024-02-17 ENCOUNTER — HOSPITAL ENCOUNTER (EMERGENCY)
Facility: HOSPITAL | Age: 69
Discharge: HOME | End: 2024-02-17
Attending: EMERGENCY MEDICINE
Payer: MEDICARE

## 2024-02-17 ENCOUNTER — APPOINTMENT (OUTPATIENT)
Dept: RADIOLOGY | Facility: HOSPITAL | Age: 69
End: 2024-02-17
Payer: MEDICARE

## 2024-02-17 ENCOUNTER — HOSPITAL ENCOUNTER (EMERGENCY)
Facility: HOSPITAL | Age: 69
Discharge: OTHER INSTITUTION | End: 2024-02-17
Attending: STUDENT IN AN ORGANIZED HEALTH CARE EDUCATION/TRAINING PROGRAM
Payer: MEDICARE

## 2024-02-17 ENCOUNTER — APPOINTMENT (OUTPATIENT)
Dept: CARDIOLOGY | Facility: HOSPITAL | Age: 69
End: 2024-02-17
Payer: MEDICARE

## 2024-02-17 VITALS
WEIGHT: 150 LBS | HEIGHT: 65 IN | HEART RATE: 84 BPM | SYSTOLIC BLOOD PRESSURE: 179 MMHG | RESPIRATION RATE: 18 BRPM | DIASTOLIC BLOOD PRESSURE: 88 MMHG | OXYGEN SATURATION: 96 % | BODY MASS INDEX: 24.99 KG/M2 | TEMPERATURE: 98.1 F

## 2024-02-17 VITALS
SYSTOLIC BLOOD PRESSURE: 157 MMHG | RESPIRATION RATE: 16 BRPM | BODY MASS INDEX: 27.19 KG/M2 | WEIGHT: 144 LBS | DIASTOLIC BLOOD PRESSURE: 80 MMHG | HEART RATE: 70 BPM | OXYGEN SATURATION: 100 % | TEMPERATURE: 98.7 F | HEIGHT: 61 IN

## 2024-02-17 DIAGNOSIS — H04.123 DRY EYES, BILATERAL: Primary | ICD-10-CM

## 2024-02-17 DIAGNOSIS — H53.2 DIPLOPIA: Primary | ICD-10-CM

## 2024-02-17 LAB
ALBUMIN SERPL BCP-MCNC: 4.4 G/DL (ref 3.4–5)
ALP SERPL-CCNC: 48 U/L (ref 33–136)
ALT SERPL W P-5'-P-CCNC: 18 U/L (ref 7–45)
ANION GAP SERPL CALC-SCNC: 11 MMOL/L (ref 10–20)
APPEARANCE UR: CLEAR
AST SERPL W P-5'-P-CCNC: 31 U/L (ref 9–39)
BASOPHILS # BLD AUTO: 0.04 X10*3/UL (ref 0–0.1)
BASOPHILS NFR BLD AUTO: 0.9 %
BILIRUB SERPL-MCNC: 0.5 MG/DL (ref 0–1.2)
BILIRUB UR STRIP.AUTO-MCNC: NEGATIVE MG/DL
BUN SERPL-MCNC: 16 MG/DL (ref 6–23)
CA-I BLD-SCNC: 1.15 MMOL/L (ref 1.1–1.33)
CALCIUM SERPL-MCNC: 9.6 MG/DL (ref 8.6–10.3)
CARDIAC TROPONIN I PNL SERPL HS: 5 NG/L (ref 0–13)
CHLORIDE SERPL-SCNC: 103 MMOL/L (ref 98–107)
CO2 SERPL-SCNC: 28 MMOL/L (ref 21–32)
COLOR UR: ABNORMAL
CREAT SERPL-MCNC: 0.73 MG/DL (ref 0.5–1.05)
EGFRCR SERPLBLD CKD-EPI 2021: 88 ML/MIN/1.73M*2
EOSINOPHIL # BLD AUTO: 0.14 X10*3/UL (ref 0–0.4)
EOSINOPHIL NFR BLD AUTO: 3 %
ERYTHROCYTE [DISTWIDTH] IN BLOOD BY AUTOMATED COUNT: 12.4 % (ref 11.5–14.5)
GLUCOSE SERPL-MCNC: 103 MG/DL (ref 74–99)
GLUCOSE UR STRIP.AUTO-MCNC: NEGATIVE MG/DL
HCT VFR BLD AUTO: 43.8 % (ref 36–46)
HGB BLD-MCNC: 14 G/DL (ref 12–16)
HOLD SPECIMEN: NORMAL
IMM GRANULOCYTES # BLD AUTO: 0.01 X10*3/UL (ref 0–0.5)
IMM GRANULOCYTES NFR BLD AUTO: 0.2 % (ref 0–0.9)
KETONES UR STRIP.AUTO-MCNC: NEGATIVE MG/DL
LEUKOCYTE ESTERASE UR QL STRIP.AUTO: NEGATIVE
LYMPHOCYTES # BLD AUTO: 0.97 X10*3/UL (ref 0.8–3)
LYMPHOCYTES NFR BLD AUTO: 21 %
MAGNESIUM SERPL-MCNC: 2.25 MG/DL (ref 1.6–2.4)
MCH RBC QN AUTO: 30.6 PG (ref 26–34)
MCHC RBC AUTO-ENTMCNC: 32 G/DL (ref 32–36)
MCV RBC AUTO: 96 FL (ref 80–100)
MONOCYTES # BLD AUTO: 0.38 X10*3/UL (ref 0.05–0.8)
MONOCYTES NFR BLD AUTO: 8.2 %
NEUTROPHILS # BLD AUTO: 3.08 X10*3/UL (ref 1.6–5.5)
NEUTROPHILS NFR BLD AUTO: 66.7 %
NITRITE UR QL STRIP.AUTO: NEGATIVE
NRBC BLD-RTO: 0 /100 WBCS (ref 0–0)
PH UR STRIP.AUTO: 7 [PH]
PLATELET # BLD AUTO: 179 X10*3/UL (ref 150–450)
POTASSIUM SERPL-SCNC: 5.2 MMOL/L (ref 3.5–5.3)
PROT SERPL-MCNC: 6.9 G/DL (ref 6.4–8.2)
PROT UR STRIP.AUTO-MCNC: NEGATIVE MG/DL
RBC # BLD AUTO: 4.57 X10*6/UL (ref 4–5.2)
RBC # UR STRIP.AUTO: NEGATIVE /UL
SODIUM SERPL-SCNC: 137 MMOL/L (ref 136–145)
SP GR UR STRIP.AUTO: 1
UROBILINOGEN UR STRIP.AUTO-MCNC: <2 MG/DL
WBC # BLD AUTO: 4.6 X10*3/UL (ref 4.4–11.3)

## 2024-02-17 PROCEDURE — 80053 COMPREHEN METABOLIC PANEL: CPT

## 2024-02-17 PROCEDURE — 70498 CT ANGIOGRAPHY NECK: CPT | Performed by: RADIOLOGY

## 2024-02-17 PROCEDURE — 36415 COLL VENOUS BLD VENIPUNCTURE: CPT | Performed by: EMERGENCY MEDICINE

## 2024-02-17 PROCEDURE — 99285 EMERGENCY DEPT VISIT HI MDM: CPT | Mod: 25 | Performed by: STUDENT IN AN ORGANIZED HEALTH CARE EDUCATION/TRAINING PROGRAM

## 2024-02-17 PROCEDURE — 70496 CT ANGIOGRAPHY HEAD: CPT

## 2024-02-17 PROCEDURE — 70450 CT HEAD/BRAIN W/O DYE: CPT | Mod: 59

## 2024-02-17 PROCEDURE — 36415 COLL VENOUS BLD VENIPUNCTURE: CPT

## 2024-02-17 PROCEDURE — 83735 ASSAY OF MAGNESIUM: CPT

## 2024-02-17 PROCEDURE — 2500000001 HC RX 250 WO HCPCS SELF ADMINISTERED DRUGS (ALT 637 FOR MEDICARE OP): Performed by: STUDENT IN AN ORGANIZED HEALTH CARE EDUCATION/TRAINING PROGRAM

## 2024-02-17 PROCEDURE — 84484 ASSAY OF TROPONIN QUANT: CPT

## 2024-02-17 PROCEDURE — 99283 EMERGENCY DEPT VISIT LOW MDM: CPT | Performed by: EMERGENCY MEDICINE

## 2024-02-17 PROCEDURE — 82330 ASSAY OF CALCIUM: CPT | Performed by: EMERGENCY MEDICINE

## 2024-02-17 PROCEDURE — 72125 CT NECK SPINE W/O DYE: CPT | Performed by: STUDENT IN AN ORGANIZED HEALTH CARE EDUCATION/TRAINING PROGRAM

## 2024-02-17 PROCEDURE — 93005 ELECTROCARDIOGRAM TRACING: CPT

## 2024-02-17 PROCEDURE — 99284 EMERGENCY DEPT VISIT MOD MDM: CPT

## 2024-02-17 PROCEDURE — 70496 CT ANGIOGRAPHY HEAD: CPT | Performed by: RADIOLOGY

## 2024-02-17 PROCEDURE — 70450 CT HEAD/BRAIN W/O DYE: CPT | Performed by: STUDENT IN AN ORGANIZED HEALTH CARE EDUCATION/TRAINING PROGRAM

## 2024-02-17 PROCEDURE — 71045 X-RAY EXAM CHEST 1 VIEW: CPT

## 2024-02-17 PROCEDURE — 81003 URINALYSIS AUTO W/O SCOPE: CPT

## 2024-02-17 PROCEDURE — 72125 CT NECK SPINE W/O DYE: CPT

## 2024-02-17 PROCEDURE — 85025 COMPLETE CBC W/AUTO DIFF WBC: CPT

## 2024-02-17 PROCEDURE — 2550000001 HC RX 255 CONTRASTS: Performed by: EMERGENCY MEDICINE

## 2024-02-17 PROCEDURE — 99285 EMERGENCY DEPT VISIT HI MDM: CPT | Performed by: STUDENT IN AN ORGANIZED HEALTH CARE EDUCATION/TRAINING PROGRAM

## 2024-02-17 PROCEDURE — 93010 ELECTROCARDIOGRAM REPORT: CPT | Performed by: STUDENT IN AN ORGANIZED HEALTH CARE EDUCATION/TRAINING PROGRAM

## 2024-02-17 RX ORDER — TETRACAINE HYDROCHLORIDE 5 MG/ML
1 SOLUTION OPHTHALMIC ONCE
Status: COMPLETED | OUTPATIENT
Start: 2024-02-17 | End: 2024-02-17

## 2024-02-17 RX ADMIN — TETRACAINE HYDROCHLORIDE 1 DROP: 5 SOLUTION OPHTHALMIC at 06:48

## 2024-02-17 RX ADMIN — IOHEXOL 50 ML: 350 INJECTION, SOLUTION INTRAVENOUS at 08:14

## 2024-02-17 SDOH — SOCIAL STABILITY: SOCIAL NETWORK: VISITOR BEHAVIORS: COOPERATIVE;ANXIOUS

## 2024-02-17 SDOH — HEALTH STABILITY: MENTAL HEALTH: BEHAVIORS/MOOD: ANXIOUS;COOPERATIVE

## 2024-02-17 SDOH — HEALTH STABILITY: MENTAL HEALTH: HAVE YOU ACTUALLY HAD ANY THOUGHTS OF KILLING YOURSELF?: NO

## 2024-02-17 SDOH — HEALTH STABILITY: MENTAL HEALTH: HAVE YOU WISHED YOU WERE DEAD OR WISHED YOU COULD GO TO SLEEP AND NOT WAKE UP?: NO

## 2024-02-17 SDOH — HEALTH STABILITY: MENTAL HEALTH: DELUSIONS: PERSECUTORY

## 2024-02-17 SDOH — HEALTH STABILITY: MENTAL HEALTH

## 2024-02-17 SDOH — HEALTH STABILITY: MENTAL HEALTH: NEEDS EXPRESSED: DENIES

## 2024-02-17 SDOH — HEALTH STABILITY: MENTAL HEALTH: DELUSIONS: PERSECUTORY;PARANOID

## 2024-02-17 SDOH — HEALTH STABILITY: MENTAL HEALTH: SUICIDE ASSESSMENT: ADULT (C-SSRS)

## 2024-02-17 SDOH — SOCIAL STABILITY: SOCIAL NETWORK: VISITOR BEHAVIORS: ANXIOUS;UNCOOPERATIVE

## 2024-02-17 SDOH — SOCIAL STABILITY: SOCIAL NETWORK: PARENT/GUARDIAN/SIGNIFICANT OTHER INVOLVEMENT: ATTENTIVE TO PATIENT NEEDS

## 2024-02-17 SDOH — SOCIAL STABILITY: SOCIAL INSECURITY: FAMILY BEHAVIORS: ANXIOUS;COOPERATIVE

## 2024-02-17 SDOH — HEALTH STABILITY: MENTAL HEALTH: HAVE YOU EVER DONE ANYTHING, STARTED TO DO ANYTHING, OR PREPARED TO DO ANYTHING TO END YOUR LIFE?: NO

## 2024-02-17 SDOH — SOCIAL STABILITY: SOCIAL NETWORK: PARENT/GUARDIAN/SIGNIFICANT OTHER INVOLVEMENT: NO INVOLVEMENT

## 2024-02-17 SDOH — HEALTH STABILITY: MENTAL HEALTH: CONTENT: BLAMING OTHERS;DELUSIONS

## 2024-02-17 SDOH — HEALTH STABILITY: MENTAL HEALTH: SLEEP PATTERN: RESTLESSNESS;INSOMNIA

## 2024-02-17 SDOH — HEALTH STABILITY: MENTAL HEALTH: CONTENT: BLAMING OTHERS;BLAMING SELF

## 2024-02-17 SDOH — SOCIAL STABILITY: SOCIAL NETWORK: EMOTIONAL SUPPORT GIVEN: PATIENT COUNSELING

## 2024-02-17 SDOH — HEALTH STABILITY: MENTAL HEALTH: SLEEP PATTERN: INSOMNIA;RESTLESSNESS

## 2024-02-17 ASSESSMENT — VISUAL ACUITY
OS: 20/30
OU: 20/50
OU: 1
OD: 20/100

## 2024-02-17 ASSESSMENT — LIFESTYLE VARIABLES
EVER FELT BAD OR GUILTY ABOUT YOUR DRINKING: NO
HAVE PEOPLE ANNOYED YOU BY CRITICIZING YOUR DRINKING: NO
HAVE YOU EVER FELT YOU SHOULD CUT DOWN ON YOUR DRINKING: NO
EVER HAD A DRINK FIRST THING IN THE MORNING TO STEADY YOUR NERVES TO GET RID OF A HANGOVER: NO

## 2024-02-17 ASSESSMENT — PAIN SCALES - GENERAL
PAINLEVEL_OUTOF10: 6
PAINLEVEL_OUTOF10: 0 - NO PAIN

## 2024-02-17 ASSESSMENT — COLUMBIA-SUICIDE SEVERITY RATING SCALE - C-SSRS
6. HAVE YOU EVER DONE ANYTHING, STARTED TO DO ANYTHING, OR PREPARED TO DO ANYTHING TO END YOUR LIFE?: NO
2. HAVE YOU ACTUALLY HAD ANY THOUGHTS OF KILLING YOURSELF?: NO
1. IN THE PAST MONTH, HAVE YOU WISHED YOU WERE DEAD OR WISHED YOU COULD GO TO SLEEP AND NOT WAKE UP?: NO

## 2024-02-17 ASSESSMENT — PAIN - FUNCTIONAL ASSESSMENT
PAIN_FUNCTIONAL_ASSESSMENT: 0-10
PAIN_FUNCTIONAL_ASSESSMENT: 0-10

## 2024-02-17 ASSESSMENT — PAIN DESCRIPTION - LOCATION: LOCATION: EYE

## 2024-02-17 ASSESSMENT — TONOMETRY
IOP_AUTOMATED: 1
OS_IOP_MMHG: 4
OD_IOP_MMHG: 6
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
## 2024-02-17 NOTE — ED TRIAGE NOTES
Pt transfer from [facility] for optho consult. Pt states this morning she had some right sided facial tickling that goes into her eye. Pt is also having some blurred vision in the right eye as well.

## 2024-02-17 NOTE — ED PROVIDER NOTES
HPI   Chief Complaint   Patient presents with    Eye Problem       Karolyn Martinez is 70 y/o F w/ HTN, HLD, COPD not on home oxygen, Hx of paroxysmal atrial tachycardia, secondary hyperparathyroidism, vitamin D deficiency, MDD, Anxiety, and currently undergoing evaluation for cognitive impairment with paranoia who presented as a transfer from [facility] for ophthalmology evaluation due to vision problems.  Patient reports that she started experiencing tingling sensation around her right eye.  Reports that her vision is blurry now even with her corrective lenses on.  Denies any double vision or eye pain currently.  Patient reports that she is concerned about a stroke from her carotids.    ROS negative unless noted in HPI       History provided by:  Medical records and patient   used: No                        Mark Coma Scale Score: 15                     Patient History   Past Medical History:   Diagnosis Date    Abnormal auditory function study     12/20:  mild to moderate sensorinueural hearing loss in the right ear and a moderate to severe mixed hearing loss in the left. Word recognition was 88% in the right and 32% in the left.    Abnormal screening cardiac CT 12/06/2022 8/21: 1. Coronary artery calcium score of 328*. (LAD, LCX, RCA, LM) 2. [place] 90th percentile** for age, gender, and race in asymptomatic patients. Coronary Artery  Agatston Score risk:Moderately increased >300 Nuclear stress test (-) 10/21 On statin    H/O bone density study     8/17/21: T score -1.9 FRAX* 10-year Probability of Fracture Based on femoral neck BMD: DualFemur (Right) Major Osteoporotic Fracture: 11.1% Hip Fracture:                1.9%    H/O bone density study 08/18/2023    LOwest T score -1.7; 10-year Fracture Risk: Major Osteoporotic Fracture  10.3 Hip Fracture                        1.7    H/O cardiovascular stress test     9/21: 1. No nuclear evidence of pharmacologic stress-induced ischemia or scar. 2.  Vigorous LV function, EF greater than 75%. 3. Based on these findings low likelihood of obstructive coronary artery disease.    H/O chest x-ray     2017: normal :normal    H/O CT scan of chest     CT Lung screenin/22: 1.  11 mm pleural-based nodular opacity in the right upper lobe which is likely an area of scarring, however recommend follow-up. 2. Moderate upper lung predominant emphysema. 3. Moderate coronary artery calcification. Severe calcification of the thoracic/abdominal aorta. : 1. Right apical pleural thickening is stable/post inflammatory/infectious.    H/O CT scan of chest 2022    1. Right apical pleural thickening is stable and felt to be post inflammatory/infectious. There are no suspicious parenchymal lung nodules. Recommendations are for continued 1 year low-dose chest CT for surveillance. 2. Severe coronary artery calcifications and correlate with coronary artery disease    H/O CT scan of chest 2023    1. Stable asymmetric postinflammatory scarring in the posterior right apex and stable presumably benign tiny nodule of the subpleural right lateral lung base dating back to the baseline 2022 exam. Advise ongoing annual low-dose lung cancer screening CT. 2. At least moderate, scattered atherosclerotic changes, including calcification seen in the coronary arteries.    H/O CT scan of chest     (Cont)  A nuclear medicine stress test was performed 2021 per chart review. See that result and obtain follow-up as clinically appropriate. 3. 6 mm nonobstructing right renal calculus which was present on the baseline 2022 examination, but motion blurred, limiting direct comparison    H/O diagnostic ultrasound     2021 RUQ: No detected cause for patient's symptoms. Liver demonstrates normal size and morphology with a 1 cm simple cyst. 14 mm simple cyst right kidney. 22: No solid or cystic lesions are identified within the liver on today's examination. . Right  nephrolithiasis, 1cm Small right renal cyst, 1.7cm 1/3/23: US kidney: Small cystic lesion suggested within the midpole of the right kidney    H/O diagnostic ultrasound     : US kidney: Small cystic lesion within the midpole of the right kidney is better demonstrated on comparison exam.   Bilateral nephrolithiasis.    H/O Doppler ultrasound     : US aorta (-)    H/O echocardiogram     18: normal LV size and function, EF 60-65%. Grade 1 diastolic dysfunction. Normal RV size and function. Trace TR.    H/O exercise stress test     18:no ST or T changes. Duke treadmill score of 7    History of Holter monitoring     ZioPatch 18 demonstrated a short run of atrial tachycardia. Triggered event corresponded to sinus rhythm.    History of PFTs 2021    2018: Severe COPD     Past Surgical History:   Procedure Laterality Date    CHOLECYSTECTOMY  2020    Cholecystectomy    COLONOSCOPY  2021    2018 diverticulosis in the descending colon, sigmoid colon and  hemorrhoids.    ESOPHAGOGASTRODUODENOSCOPY  2021    2018: EGD showed a 2 cm hiatal hernia and LA grade A esophagitis.    HYSTERECTOMY  2021    s/p MARCIO, ovaries remain    MASTOID SURGERY  2021  Left tympanoplasty, mastoidectomy, posterior atticotomy, facial recess, composite cartilage/ perichondrium graft.Cultures from OR grew Aspergillus Niger    TYMPANOPLASTY  2021  Left tympanoplasty, mastoidectomy, posterior atticotomy, facial recess, composite cartilage/ perichondrium graft.Cultures from OR grew Aspergillus Niger     Family History   Problem Relation Name Age of Onset    No Known Problems Mother          F; Lung CA ( age 78) M: Colon CA ( age 70s) B: AML Leukemia( age 74) S: Ovarian CA ( 72) S: CAD ( 69) D: Janey Davis [facility]     Social History     Tobacco Use    Smoking status: Former     Types: Cigarettes     Quit date: 2012     Years since  quittin.1    Smokeless tobacco: Never   Substance Use Topics    Alcohol use: Never    Drug use: Never       Physical Exam   ED Triage Vitals [24 1255]   Temperature Heart Rate Respirations BP   37.1 °C (98.7 °F) 94 18 (!) 151/96      Pulse Ox Temp Source Heart Rate Source Patient Position   94 % Oral Monitor Sitting      BP Location FiO2 (%)     Right arm --       Physical Exam  Vitals and nursing note reviewed.   Constitutional:       General: She is not in acute distress.     Appearance: Normal appearance. She is not ill-appearing, toxic-appearing or diaphoretic.   HENT:      Head: Normocephalic and atraumatic.      Right Ear: External ear normal.      Left Ear: External ear normal.      Nose: Nose normal.      Mouth/Throat:      Mouth: Mucous membranes are moist.      Pharynx: Oropharynx is clear.   Eyes:      General: No scleral icterus.        Right eye: No discharge.         Left eye: No discharge.      Extraocular Movements: Extraocular movements intact.      Conjunctiva/sclera: Conjunctivae normal.      Pupils: Pupils are equal, round, and reactive to light.   Neck:      Vascular: No carotid bruit.   Cardiovascular:      Rate and Rhythm: Normal rate and regular rhythm.      Pulses: Normal pulses.      Heart sounds: Normal heart sounds. No murmur heard.     No friction rub. No gallop.   Pulmonary:      Effort: Pulmonary effort is normal. No respiratory distress.      Breath sounds: Normal breath sounds. No stridor. No wheezing, rhonchi or rales.   Chest:      Chest wall: No tenderness.   Abdominal:      General: Abdomen is flat. Bowel sounds are normal. There is no distension.      Palpations: Abdomen is soft.      Tenderness: There is no abdominal tenderness. There is no guarding.   Musculoskeletal:         General: Normal range of motion.      Cervical back: Normal range of motion and neck supple. No rigidity or tenderness.   Lymphadenopathy:      Cervical: No cervical adenopathy.   Skin:      General: Skin is warm and dry.      Capillary Refill: Capillary refill takes less than 2 seconds.   Neurological:      General: No focal deficit present.      Mental Status: She is alert and oriented to person, place, and time.      Cranial Nerves: No cranial nerve deficit.      Sensory: No sensory deficit.      Motor: No weakness.   Psychiatric:         Attention and Perception: Attention normal.         Mood and Affect: Mood is anxious.         Speech: Speech normal.         Behavior: Behavior normal.         Thought Content: Thought content is paranoid.         ED Course & MDM        Medical Decision Making  Karolyn Martinez is 70 y/o F w/ HTN, HLD, COPD not on home oxygen, Hx of paroxysmal atrial tachycardia, secondary hyperparathyroidism, vitamin D deficiency, MDD, Anxiety, and currently undergoing evaluation for cognitive impairment with paranoia who presented as a transfer from [facility] for ophthalmology evaluation due to vision problems. On arrival to [facility] ED patient is HDS and in NAD.  At time of assessment patient was reporting vision blurriness in her right eye, and denied any R eye pain or R sided facial pain.  Review of record at OS, demonstrated that the patient underwent an extensive work up including EKG, CMP, CBC w/ diff, Troponins, UA, POCT Ionized calcium, CXR, CT head w/o IV contrast, CT cervical spine w/o IV contrast, and CTA angiogram of head and neck.  Work up at [facility] hospital was negative for any acute finding with abnormal lab findings of slightly elevated BS and low specific gravity of 1.003.  Physical exam at OSH was significant for decreased visual acuity bilateral visual acuity of 20/50, right eye visual acuity at distance of 20/100, left eye visual acuity of 20/30.  The patient was noted to have normal IOP with R IOP of 6 mmHg and L IOP of 4 mmHg.  Physical exam was no significant for any acute findings.  Ophthalmology was consulted and notified of the patient arrival dispo pending  ophthalmology evaluation.      Ophthalmology evaluated the patient and reported no acute concerns from ophthalmological prospective.  Reported patient vision had returned to 20/20, and diagnosed the patient with dry eye with prescription of artifical tears.  Discussed all the work up and finding with the patient and her daughter via the phone.  Patient will be discharged up home in stable conditions, with recommendations to follow up with regular outpatient providers.      Patient was examined and discussed with Dr. Warner Marrero MD MS  PGY-2 Family Medicine           Procedure  Procedures     Eder Marrero MD  Resident  02/17/24 7206
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
## 2024-02-17 NOTE — ED NOTES
0974--report given to Meghann MIRANDA @ [facility] ED, pu time 1030, meghann aware of pt lack of capacity and elopement risk.  Meghann states they will have a sitter for pt     Sara Clemens RN  02/17/24 1830
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
## 2024-02-17 NOTE — ED PROCEDURE NOTE
"Capacity Evaluation     Capacity Assessment Tool    \"Capacity\" is the \"ability\" to make a decision.  The decision in question must be specific (one decision), relevant to a patient's current condition (appropriate), and timely (neither prospective nor retrospective).    Capacity varies based on knowledge base (explanation/understanding of clinical information), cognitive processing, acute psychiatric illness, and other clinical conditions.    In order to be deemed \"capacitated\" to make a single decision at one point in time, a patient must demonstrate all 4 of the following elements:    *Ability to consistently communicate a choice (consistent over time with adequate information)  *Ability to understand the relevant information (accurate knowledge of condition)  *Ability to appreciate the situation and its consequences (risks/benefits, pros/cons)  *Ability to reason about treatment options (without undue influence of a person or condition, eg. suicidality or acute psychosis)      Current Decision    Clinical issue:   Patient has altered vision in her eye and neck pain with paranoia and anxiety.  Attempting to leave the ED after we spoke with the  and her about further work up and transfer to main campus.      Did the appropriate team address relevant information with the patient:  Yes    Date: 2/17/24    If \"NO\" is selected for appropriate team, then please discuss with the appropriate team.  The appropriate team should be encouraged to address relevant information with the patient AND reevaluate capacity when appropriate.    Capacity Evaluation    Patient demonstrates ability to consistently communicate choice:  No     Patient demonstrates ability to understand the relevant information:  Yes     Patient demonstrates ability to appreciate the situation and its consequences:  No     Patient demonstrates ability to reason about treatment options:  No     If ANY of the above items are answered \"NO,\" the patient " LACKS CAPACITY for that specific decision at hand, at that specific time.  Further capacity evaluations can be done as needed.                Alireza Viveros,   02/17/24 0839
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
## 2024-02-19 LAB
ATRIAL RATE: 66 BPM
P AXIS: 78 DEGREES
P OFFSET: 209 MS
P ONSET: 158 MS
PR INTERVAL: 130 MS
Q ONSET: 223 MS
QRS COUNT: 11 BEATS
QRS DURATION: 72 MS
QT INTERVAL: 424 MS
QTC CALCULATION(BAZETT): 444 MS
QTC FREDERICIA: 438 MS
R AXIS: 74 DEGREES
T AXIS: 65 DEGREES
T OFFSET: 435 MS
VENTRICULAR RATE: 66 BPM

## 2024-02-20 ENCOUNTER — APPOINTMENT (OUTPATIENT)
Dept: PRIMARY CARE | Facility: CLINIC | Age: 69
End: 2024-02-20
Payer: MEDICARE

## 2024-03-06 ENCOUNTER — HOSPITAL ENCOUNTER (OUTPATIENT)
Dept: RADIOLOGY | Facility: CLINIC | Age: 69
Discharge: HOME | End: 2024-03-06
Payer: MEDICARE

## 2024-03-06 DIAGNOSIS — R41.9 COGNITIVE COMPLAINTS: ICD-10-CM

## 2024-03-06 PROBLEM — Z86.73 H/O: STROKE: Status: ACTIVE | Noted: 2024-03-06

## 2024-03-06 PROCEDURE — 70551 MRI BRAIN STEM W/O DYE: CPT

## 2024-03-06 PROCEDURE — 70551 MRI BRAIN STEM W/O DYE: CPT | Performed by: RADIOLOGY

## 2024-03-07 ENCOUNTER — TELEPHONE (OUTPATIENT)
Dept: PRIMARY CARE | Facility: CLINIC | Age: 69
End: 2024-03-07
Payer: MEDICARE

## 2024-03-07 NOTE — TELEPHONE ENCOUNTER
Patient called stating she called central scheduling to schedule neuro referral and earliest for [facility] is August. Patient scheduled appt but wanted to know if PCP wants her seen sooner and if so who else she could possibly call to see if she can get in sooner. Please advise

## 2024-03-07 NOTE — TELEPHONE ENCOUNTER
[facility] Neuro non   Dr Johann Kat in [place]/[facility]  ERNIE Hassan in [place] or Connor Castaneda in [place] (I think)  Or  [facility] for the initial neuro consultation if they can get her in sooner then she can follow up on the [facility]

## 2024-03-14 ENCOUNTER — APPOINTMENT (OUTPATIENT)
Dept: BEHAVIORAL HEALTH | Facility: CLINIC | Age: 69
End: 2024-03-14
Payer: MEDICARE

## 2024-03-18 ENCOUNTER — OFFICE VISIT (OUTPATIENT)
Dept: PRIMARY CARE | Facility: CLINIC | Age: 69
End: 2024-03-18
Payer: MEDICARE

## 2024-03-18 VITALS
BODY MASS INDEX: 27.28 KG/M2 | TEMPERATURE: 97.3 F | DIASTOLIC BLOOD PRESSURE: 76 MMHG | OXYGEN SATURATION: 95 % | WEIGHT: 144.4 LBS | HEART RATE: 65 BPM | SYSTOLIC BLOOD PRESSURE: 120 MMHG

## 2024-03-18 DIAGNOSIS — E78.5 HYPERLIPIDEMIA, UNSPECIFIED HYPERLIPIDEMIA TYPE: ICD-10-CM

## 2024-03-18 DIAGNOSIS — I25.10 CORONARY ARTERY CALCIFICATION: ICD-10-CM

## 2024-03-18 DIAGNOSIS — I25.84 CORONARY ARTERY CALCIFICATION: ICD-10-CM

## 2024-03-18 DIAGNOSIS — I10 PRIMARY HYPERTENSION: ICD-10-CM

## 2024-03-18 DIAGNOSIS — Z86.73 H/O: STROKE: Primary | ICD-10-CM

## 2024-03-18 PROCEDURE — 1159F MED LIST DOCD IN RCRD: CPT | Performed by: NURSE PRACTITIONER

## 2024-03-18 PROCEDURE — 1123F ACP DISCUSS/DSCN MKR DOCD: CPT | Performed by: NURSE PRACTITIONER

## 2024-03-18 PROCEDURE — 3078F DIAST BP <80 MM HG: CPT | Performed by: NURSE PRACTITIONER

## 2024-03-18 PROCEDURE — 3074F SYST BP LT 130 MM HG: CPT | Performed by: NURSE PRACTITIONER

## 2024-03-18 PROCEDURE — 99214 OFFICE O/P EST MOD 30 MIN: CPT | Performed by: NURSE PRACTITIONER

## 2024-03-18 PROCEDURE — 1160F RVW MEDS BY RX/DR IN RCRD: CPT | Performed by: NURSE PRACTITIONER

## 2024-03-18 PROCEDURE — 1036F TOBACCO NON-USER: CPT | Performed by: NURSE PRACTITIONER

## 2024-03-18 PROCEDURE — 1158F ADVNC CARE PLAN TLK DOCD: CPT | Performed by: NURSE PRACTITIONER

## 2024-03-18 RX ORDER — SOD SULF/POT CHLORIDE/MAG SULF 1.479 G
TABLET ORAL
COMMUNITY
Start: 2023-09-13 | End: 2024-04-18 | Stop reason: WASHOUT

## 2024-03-18 RX ORDER — ROSUVASTATIN CALCIUM 10 MG/1
10 TABLET, COATED ORAL DAILY
Qty: 90 TABLET | Refills: 3 | Status: SHIPPED | OUTPATIENT
Start: 2024-03-18 | End: 2025-03-18

## 2024-03-18 RX ORDER — METOPROLOL SUCCINATE 50 MG/1
50 TABLET, EXTENDED RELEASE ORAL DAILY
COMMUNITY
Start: 2021-11-15

## 2024-03-18 RX ORDER — EPINEPHRINE 0.22MG
1 AEROSOL WITH ADAPTER (ML) INHALATION 2 TIMES DAILY
COMMUNITY
Start: 2021-08-06

## 2024-03-18 RX ORDER — FLUTICASONE PROPIONATE 50 MCG
SPRAY, SUSPENSION (ML) NASAL EVERY 12 HOURS
COMMUNITY
Start: 2019-09-08

## 2024-03-18 RX ORDER — SIMVASTATIN 40 MG/1
TABLET, FILM COATED ORAL
COMMUNITY
Start: 2009-06-24 | End: 2024-03-18 | Stop reason: WASHOUT

## 2024-03-18 ASSESSMENT — PATIENT HEALTH QUESTIONNAIRE - PHQ9
5. POOR APPETITE OR OVEREATING: NOT AT ALL
7. TROUBLE CONCENTRATING ON THINGS, SUCH AS READING THE NEWSPAPER OR WATCHING TELEVISION: NEARLY EVERY DAY
SUM OF ALL RESPONSES TO PHQ QUESTIONS 1-9: 11
3. TROUBLE FALLING OR STAYING ASLEEP OR SLEEPING TOO MUCH: NEARLY EVERY DAY
10. IF YOU CHECKED OFF ANY PROBLEMS, HOW DIFFICULT HAVE THESE PROBLEMS MADE IT FOR YOU TO DO YOUR WORK, TAKE CARE OF THINGS AT HOME, OR GET ALONG WITH OTHER PEOPLE: NOT DIFFICULT AT ALL
9. THOUGHTS THAT YOU WOULD BE BETTER OFF DEAD, OR OF HURTING YOURSELF: NOT AT ALL
8. MOVING OR SPEAKING SO SLOWLY THAT OTHER PEOPLE COULD HAVE NOTICED. OR THE OPPOSITE, BEING SO FIGETY OR RESTLESS THAT YOU HAVE BEEN MOVING AROUND A LOT MORE THAN USUAL: SEVERAL DAYS
SUM OF ALL RESPONSES TO PHQ9 QUESTIONS 1 AND 2: 3
2. FEELING DOWN, DEPRESSED OR HOPELESS: NEARLY EVERY DAY
6. FEELING BAD ABOUT YOURSELF - OR THAT YOU ARE A FAILURE OR HAVE LET YOURSELF OR YOUR FAMILY DOWN: NOT AT ALL
1. LITTLE INTEREST OR PLEASURE IN DOING THINGS: NOT AT ALL
4. FEELING TIRED OR HAVING LITTLE ENERGY: SEVERAL DAYS

## 2024-03-18 NOTE — ASSESSMENT & PLAN NOTE
BP cuff check today  Taking metoprolol daily, but not consistent with time  Reviewed home readings  SBP all <130, DBP in 80s  She will take BP AFTER medication and follow back up

## 2024-03-18 NOTE — PROGRESS NOTES
Subjective   Patient ID: Karolyn Martinez is a 69 y.o. female who presents for Follow-up (Bp check - Smart Heart cuff -136/79 hr-69).    Her cuff 136/79   69   134/74    Office cuff 125/84  65  120/76  122/80    Her cuff ave  135/74  Office cuff ave 122/80  ---  Home readings  Some are before metoprolol  136/80  71  140/87  78  132/74  65  144/81  69  142/90  85  119/73  57  131/77  63      Review of Systems  ROS completely negative except what was mentioned in the HPI.  Problem List, surgical, social, and family histories which were reviewed and updated as necessary within the EMR. I also personally reviewed the notes, labs, and imaging that pertained to what was documented or discussed in the HPI.    Objective   Physical Exam  Vitals and nursing note reviewed.   Constitutional:       Appearance: Normal appearance.   HENT:      Head: Normocephalic and atraumatic.      Right Ear: External ear normal.      Left Ear: External ear normal.      Nose: Nose normal.      Mouth/Throat:      Mouth: Mucous membranes are moist.   Eyes:      Extraocular Movements: Extraocular movements intact.      Conjunctiva/sclera: Conjunctivae normal.      Pupils: Pupils are equal, round, and reactive to light.   Pulmonary:      Effort: Pulmonary effort is normal.   Musculoskeletal:      Cervical back: Normal range of motion and neck supple.   Neurological:      General: No focal deficit present.      Mental Status: She is alert and oriented to person, place, and time. Mental status is at baseline.   Psychiatric:         Mood and Affect: Mood normal.         Behavior: Behavior normal.         Thought Content: Thought content normal.         Judgment: Judgment normal.       /84   Pulse 65   Temp 36.3 °C (97.3 °F) (Temporal)   Wt 65.5 kg (144 lb 6.4 oz)   SpO2 95%   BMI 27.28 kg/m²     Assessment/Plan    Problem List Items Addressed This Visit       H/O: stroke - Primary    Overview     3/24: MRI brain : There is moderate brain  parenchymal volume loss.      Mild nonspecific white matter changes are noted within the cerebral  hemispheres bilaterally which while nonspecific, given the patient's  age, likely represent sequelae of more remote small-vessel ischemic  change.      There is a remote lacunar infarction involving the right caudate head  with mild asymmetric compensatory dilatation of the adjacent right  lateral ventricle. The gradient echo T2 weighted images demonstrate a  curvilinear focus of dark signal on this region suggesting a small  amount of hemosiderin deposition anterior dystrophic  calcification/mineralization.         Current Assessment & Plan     On statin and BB         Relevant Orders    Referral to Clinical Pharmacy    Hyperlipidemia    Overview     8/21: 1. Coronary artery calcium score of 328*. (LAD, LCX, RCA, LM)   Goal LDL <100  on statin in past (Self D/C'd)  monitor         Current Assessment & Plan     Was only taking statin twice weekly instead of daily   She will begin daily  She is agreeable to pharmacy consult         Relevant Medications    rosuvastatin (Crestor) 10 mg tablet    Other Relevant Orders    Referral to Clinical Pharmacy    Primary hypertension    Overview     Goal <130/80  MALDONADO diet & Exercise  ACEi caused cough  Tolerated BB in past, restarted in 2024    3/18/24 Cuff Comparison  Her cuff ave  135/74  Office cuff ave 122/80         Current Assessment & Plan     BP cuff check today  Taking metoprolol daily, but not consistent with time  Reviewed home readings  SBP all <130, DBP in 80s  She will take BP AFTER medication and follow back up          Other Visit Diagnoses       Coronary artery calcification        Relevant Medications    metoprolol succinate XL (Toprol-XL) 50 mg 24 hr tablet    rosuvastatin (Crestor) 10 mg tablet    Other Relevant Orders    Referral to Clinical Pharmacy

## 2024-03-18 NOTE — PATIENT INSTRUCTIONS
Take blood pressure & heart rate reading 2 or more hours after your BP medication  Choose the calmest time of day  Sit with feet uncrossed, flat on the floor, arm at heart level  If first reading is elevated, wait 3-5 minutes and repeat & record    Record at least 1 weeks worth of readings and send via [email or web address]    Subtract 10 from top number  Add 5 to bottom number

## 2024-03-18 NOTE — ASSESSMENT & PLAN NOTE
Was only taking statin twice weekly instead of daily   She will begin daily  She is agreeable to pharmacy consult

## 2024-03-19 ENCOUNTER — OFFICE VISIT (OUTPATIENT)
Dept: BEHAVIORAL HEALTH | Facility: CLINIC | Age: 69
End: 2024-03-19
Payer: MEDICARE

## 2024-03-19 VITALS
SYSTOLIC BLOOD PRESSURE: 147 MMHG | DIASTOLIC BLOOD PRESSURE: 84 MMHG | WEIGHT: 141 LBS | BODY MASS INDEX: 26.62 KG/M2 | HEART RATE: 73 BPM | HEIGHT: 61 IN

## 2024-03-19 DIAGNOSIS — G31.84 MILD COGNITIVE IMPAIRMENT: ICD-10-CM

## 2024-03-19 DIAGNOSIS — F29 PSYCHOSIS, UNSPECIFIED PSYCHOSIS TYPE (MULTI): ICD-10-CM

## 2024-03-19 DIAGNOSIS — F32.1 CURRENT MODERATE EPISODE OF MAJOR DEPRESSIVE DISORDER WITHOUT PRIOR EPISODE (MULTI): ICD-10-CM

## 2024-03-19 PROCEDURE — 3079F DIAST BP 80-89 MM HG: CPT | Performed by: PSYCHIATRY & NEUROLOGY

## 2024-03-19 PROCEDURE — 1159F MED LIST DOCD IN RCRD: CPT | Performed by: PSYCHIATRY & NEUROLOGY

## 2024-03-19 PROCEDURE — 1160F RVW MEDS BY RX/DR IN RCRD: CPT | Performed by: PSYCHIATRY & NEUROLOGY

## 2024-03-19 PROCEDURE — 1036F TOBACCO NON-USER: CPT | Performed by: PSYCHIATRY & NEUROLOGY

## 2024-03-19 PROCEDURE — 3077F SYST BP >= 140 MM HG: CPT | Performed by: PSYCHIATRY & NEUROLOGY

## 2024-03-19 PROCEDURE — 90792 PSYCH DIAG EVAL W/MED SRVCS: CPT | Performed by: PSYCHIATRY & NEUROLOGY

## 2024-03-19 PROCEDURE — 1123F ACP DISCUSS/DSCN MKR DOCD: CPT | Performed by: PSYCHIATRY & NEUROLOGY

## 2024-03-19 RX ORDER — SERTRALINE HYDROCHLORIDE 50 MG/1
50 TABLET, FILM COATED ORAL DAILY
Qty: 90 TABLET | Refills: 0 | Status: SHIPPED | OUTPATIENT
Start: 2024-03-19

## 2024-03-19 ASSESSMENT — ENCOUNTER SYMPTOMS
DYSPHORIC MOOD: 0
APPETITE CHANGE: 0
VOMITING: 0
TREMORS: 0
HALLUCINATIONS: 0
SHORTNESS OF BREATH: 0
PALPITATIONS: 0
AGITATION: 0
FATIGUE: 0
NERVOUS/ANXIOUS: 1
HYPERACTIVE: 0
DIFFICULTY URINATING: 0
CONFUSION: 0
SEIZURES: 0
DECREASED CONCENTRATION: 0
NAUSEA: 0
DIARRHEA: 0
SLEEP DISTURBANCE: 0
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
## 2024-04-09 ENCOUNTER — APPOINTMENT (OUTPATIENT)
Dept: PRIMARY CARE | Facility: CLINIC | Age: 69
End: 2024-04-09
Payer: MEDICARE

## 2024-04-10 ENCOUNTER — APPOINTMENT (OUTPATIENT)
Dept: PHARMACY | Facility: HOSPITAL | Age: 69
End: 2024-04-10
Payer: MEDICARE

## 2024-04-17 PROBLEM — K21.9 GASTROESOPHAGEAL REFLUX DISEASE WITH HIATAL HERNIA: Status: ACTIVE | Noted: 2024-04-17

## 2024-04-17 PROBLEM — R10.30 LOWER ABDOMINAL PAIN: Status: ACTIVE | Noted: 2024-04-17

## 2024-04-17 PROBLEM — Z86.79 HISTORY OF HYPERTENSION: Status: ACTIVE | Noted: 2023-07-23

## 2024-04-17 PROBLEM — N63.20 BREAST MASS, LEFT: Status: ACTIVE | Noted: 2022-08-23

## 2024-04-17 PROBLEM — H92.10 PURULENT OTORRHEA: Status: ACTIVE | Noted: 2024-04-17

## 2024-04-17 PROBLEM — E21.3 HYPERPARATHYROIDISM (MULTI): Status: ACTIVE | Noted: 2023-08-18

## 2024-04-17 PROBLEM — K14.0 GLOSSITIS: Status: ACTIVE | Noted: 2024-04-17

## 2024-04-17 PROBLEM — H61.20 IMPACTED CERUMEN: Status: ACTIVE | Noted: 2024-04-17

## 2024-04-17 PROBLEM — R00.2 PALPITATIONS: Status: ACTIVE | Noted: 2024-04-17

## 2024-04-17 PROBLEM — R09.81 CONGESTION OF PARANASAL SINUS: Status: ACTIVE | Noted: 2024-04-17

## 2024-04-17 PROBLEM — F32.A DEPRESSIVE DISORDER: Status: ACTIVE | Noted: 2023-07-23

## 2024-04-17 PROBLEM — R74.01 HIGH ALANINE AMINOTRANSFERASE (ALT) LEVEL: Status: ACTIVE | Noted: 2024-04-17

## 2024-04-17 PROBLEM — R39.9 SYMPTOMS INVOLVING URINARY SYSTEM: Status: ACTIVE | Noted: 2024-04-17

## 2024-04-17 PROBLEM — K44.9 GASTROESOPHAGEAL REFLUX DISEASE WITH HIATAL HERNIA: Status: ACTIVE | Noted: 2024-04-17

## 2024-04-17 PROBLEM — H04.123 DRY EYES: Status: ACTIVE | Noted: 2024-02-17

## 2024-04-17 PROBLEM — H53.2 DIPLOPIA: Status: ACTIVE | Noted: 2024-04-17

## 2024-04-17 PROBLEM — K63.5 POLYP OF COLON: Status: ACTIVE | Noted: 2024-04-17

## 2024-04-17 PROBLEM — R79.89 ABNORMAL LIVER FUNCTION TESTS: Status: ACTIVE | Noted: 2022-08-23

## 2024-04-17 PROBLEM — M85.80 OSTEOPENIA: Status: ACTIVE | Noted: 2024-04-17

## 2024-04-17 PROBLEM — T63.441A BEE STING: Status: ACTIVE | Noted: 2024-04-17

## 2024-04-17 PROBLEM — B37.0 CANDIDIASIS OF MOUTH: Status: ACTIVE | Noted: 2024-04-17

## 2024-04-17 PROBLEM — H92.09 REFERRED OTALGIA: Status: ACTIVE | Noted: 2024-04-17

## 2024-04-17 PROBLEM — R07.9 CHEST PAIN: Status: ACTIVE | Noted: 2017-10-12

## 2024-04-18 ENCOUNTER — OFFICE VISIT (OUTPATIENT)
Dept: CARDIOLOGY | Facility: CLINIC | Age: 69
End: 2024-04-18
Payer: MEDICARE

## 2024-04-18 VITALS
BODY MASS INDEX: 26.81 KG/M2 | SYSTOLIC BLOOD PRESSURE: 128 MMHG | DIASTOLIC BLOOD PRESSURE: 70 MMHG | HEIGHT: 61 IN | HEART RATE: 61 BPM | WEIGHT: 142 LBS | OXYGEN SATURATION: 97 %

## 2024-04-18 DIAGNOSIS — I25.10 CORONARY ARTERY CALCIFICATION: Primary | ICD-10-CM

## 2024-04-18 DIAGNOSIS — I25.84 CORONARY ARTERY CALCIFICATION: Primary | ICD-10-CM

## 2024-04-18 DIAGNOSIS — E78.5 DYSLIPIDEMIA: ICD-10-CM

## 2024-04-18 DIAGNOSIS — I10 PRIMARY HYPERTENSION: ICD-10-CM

## 2024-04-18 PROBLEM — R07.9 CHEST PAIN: Status: RESOLVED | Noted: 2017-10-12 | Resolved: 2024-04-18

## 2024-04-18 PROBLEM — Z71.89 CARDIAC RISK COUNSELING: Status: RESOLVED | Noted: 2023-07-23 | Resolved: 2024-04-18

## 2024-04-18 PROBLEM — Z86.79 HISTORY OF HYPERTENSION: Status: RESOLVED | Noted: 2023-07-23 | Resolved: 2024-04-18

## 2024-04-18 PROCEDURE — 1160F RVW MEDS BY RX/DR IN RCRD: CPT | Performed by: INTERNAL MEDICINE

## 2024-04-18 PROCEDURE — 3078F DIAST BP <80 MM HG: CPT | Performed by: INTERNAL MEDICINE

## 2024-04-18 PROCEDURE — 1159F MED LIST DOCD IN RCRD: CPT | Performed by: INTERNAL MEDICINE

## 2024-04-18 PROCEDURE — 99214 OFFICE O/P EST MOD 30 MIN: CPT | Performed by: INTERNAL MEDICINE

## 2024-04-18 PROCEDURE — 3074F SYST BP LT 130 MM HG: CPT | Performed by: INTERNAL MEDICINE

## 2024-04-18 PROCEDURE — 1123F ACP DISCUSS/DSCN MKR DOCD: CPT | Performed by: INTERNAL MEDICINE

## 2024-04-18 NOTE — PROGRESS NOTES
Chief Complaint:   No chief complaint on file.     History Of Present Illness:    Karolyn Martinez is a 69 y.o. female with a history of dyslipidemia, coronary artery calcification, palpitations (likely secondary to paroxysmal SVT), prior tobacco use, and a family history of premature CAD here for establishment of cardiovascular care.    The patient had a CT scan of the head that demonstrated old lacunar infarcts.  She denies any recent neurologic symptoms such as numbness, tingling or weakness.    Unfortunately her  Franky passed away yesterday.  She brought her daughter to the airport and then came back home to see Franky.  She told him that she was going to go to her daughter's house to feed the dog.  He was having no issues.  When she returned from feeding the dogs she did not hear him walking around.  She looked and he was found down in the bathroom.  EMS was called however he was pronounced dead at the scene.    She is understandably upset about this situation.    Nuclear stress test 9/30/2021: No evidence of ischemia or scar.  EF greater than 75%.    CT calcium score August 2021: Total score 328  LM 16    LCx 72  RCA 45  Ascending aorta measuring 3.2 cm    ZioPatch 5/7/18 demonstrated a short run of atrial tachycardia. Triggered event corresponded to sinus rhythm.     Stress test 5/7/18 demonstrating no ST or T changes. Chavez treadmill score of 7     Echocardiogram 5/21/18 demonstrating normal LV size and function, EF 60-65%. Grade 1 diastolic dysfunction. Normal RV size and function. Trace TR.     Past Medical History:  She has a past medical history of Abnormal auditory function study, Abnormal screening cardiac CT (12/06/2022), H/O bone density study, H/O bone density study (08/18/2023), H/O cardiovascular stress test, H/O chest x-ray, H/O CT scan of chest, H/O CT scan of chest (11/23/2022), H/O CT scan of chest (11/14/2023), H/O CT scan of chest, H/O diagnostic ultrasound, H/O diagnostic  ultrasound, H/O Doppler ultrasound, H/O echocardiogram, H/O exercise stress test, H/O magnetic resonance imaging of brain and brain stem (2024), H/O magnetic resonance imaging of brain and brain stem, History of Holter monitoring, and History of PFTs (2021).    Past Surgical History:  She has a past surgical history that includes Cholecystectomy (2020); Tympanoplasty (2021); Mastoid surgery (2021); Colonoscopy (2021); Esophagogastroduodenoscopy (2021); and Hysterectomy (2021).      Social History:  She reports that she quit smoking about 12 years ago. Her smoking use included cigarettes. She has never used smokeless tobacco. She reports that she does not drink alcohol and does not use drugs.    Family History:  Family History   Problem Relation Name Age of Onset    No Known Problems Mother          F; Lung CA ( age 78) M: Colon CA ( age 70s) B: AML Leukemia( age 74) S: Ovarian CA ( 72) S: CAD ( 69) D: Janey Lance        Allergies:  Lisinopril and Penicillins    Outpatient Medications:  Current Outpatient Medications   Medication Instructions    albuterol 90 mcg/actuation inhaler 2 puffs, inhalation, Every 4 hours PRN    ascorbic acid (Vitamin C) 1,000 mg tablet 1 tablet, oral, Daily    cholecalciferol (Vitamin D-3) 50 mcg (2,000 unit) capsule 1 capsule, oral, Daily    coenzyme Q-10 100 mg capsule 1 capsule, oral, 2 times daily    cyanocobalamin (Vitamin B-12) 1,000 mcg tablet 1 tablet, oral, Daily    fish oil concentrate (Omega-3) 120-180 mg capsule 1 g, oral, Daily    fluticasone (Flonase) 50 mcg/actuation nasal spray nasal, Every 12 hours    metoprolol succinate XL (TOPROL-XL) 50 mg, oral, Daily    omeprazole (PRILOSEC) 40 mg, oral, Daily before breakfast, Do not crush or chew.    rosuvastatin (CRESTOR) 10 mg, oral, Daily    sertraline (ZOLOFT) 50 mg, oral, Daily    valACYclovir (VALTREX) 2,000 mg, oral, 2 times daily  "      Last Recorded Vitals:  Visit Vitals  /70 (BP Location: Left arm, Patient Position: Sitting)   Pulse 61   Ht 1.549 m (5' 1\")   Wt 64.4 kg (142 lb)   SpO2 97%   BMI 26.83 kg/m²   OB Status Postmenopausal   Smoking Status Former   BSA 1.66 m²      LASTWT(3):   Wt Readings from Last 3 Encounters:   04/18/24 64.4 kg (142 lb)   03/19/24 64 kg (141 lb)   03/18/24 65.5 kg (144 lb 6.4 oz)       Physical Exam:  In general: alert and in no acute distress.   HEENT: Carotid upstrokes normal with no bruits. JVP is normal.   Pulmonary: Clear to auscultation bilaterally.  Cardiovascular: S1,S2, regular. No appreciable murmurs, rubs or gallops.   Lower extremities: Warm. 2+ distal pulses. No edema.     Last Labs:  CBC -  Recent Labs     02/17/24  0608 02/15/24  0839 08/21/23  1031   WBC 4.6 5.0 4.8   HGB 14.0 14.3 14.3   HCT 43.8 44.1 44.1    204 187   MCV 96 95 95       CMP -  Recent Labs     02/17/24  0608 02/15/24  0839 08/21/23  1031    141 141   K 5.2 5.1 4.4    104 103   CO2 28 31 30   ANIONGAP 11 11 12   BUN 16 12 17   CREATININE 0.73 0.87 0.80   EGFR 88 71  --    MG 2.25  --   --      Recent Labs     02/17/24  0608 02/15/24  0839 08/21/23  1031   ALBUMIN 4.4 4.6 4.4   ALKPHOS 48 51 50   ALT 18 17 14   AST 31 20 18   BILITOT 0.5 0.6 0.5       LIPID PANEL -   Recent Labs     02/15/24  0839 08/21/23  1031 07/01/22  0758 09/30/21  0715   CHOL 198 175 180 141   LDLCALC 123*  --   --   --    LDLF  --  107* 116* 80   HDL 60.4 56.1 52.0 49.0   TRIG 73 61 61 59       Recent Labs     01/09/23  0705   HGBA1C 5.4           Assessment/Plan   1) coronary artery calcification: Mildly to moderately elevated score 2021 with subsequent stress testing demonstrating low likelihood of flow-limiting coronary disease.  Will continue with lifestyle and risk factor modification.    2) hypertension: Blood pressure well-controlled    3) dyslipidemia: Started on statin therapy.    4) CVA: With a lacunar infarct it is most " likely at this was hypertensive event.  For now we will start ASA 81mg daily.  Continue with statin therapy.    5) follow-up: 3-6 mo      Praful Mueller MD

## 2024-04-30 ENCOUNTER — APPOINTMENT (OUTPATIENT)
Dept: BEHAVIORAL HEALTH | Facility: CLINIC | Age: 69
End: 2024-04-30
Payer: MEDICARE

## 2024-05-20 ENCOUNTER — TELEMEDICINE (OUTPATIENT)
Dept: PHARMACY | Facility: HOSPITAL | Age: 69
End: 2024-05-20
Payer: MEDICARE

## 2024-05-20 DIAGNOSIS — E78.5 HYPERLIPIDEMIA, UNSPECIFIED HYPERLIPIDEMIA TYPE: ICD-10-CM

## 2024-05-20 DIAGNOSIS — I25.84 CORONARY ARTERY CALCIFICATION: ICD-10-CM

## 2024-05-20 DIAGNOSIS — I25.10 CORONARY ARTERY CALCIFICATION: ICD-10-CM

## 2024-05-20 DIAGNOSIS — Z86.73 H/O: STROKE: ICD-10-CM

## 2024-05-20 DIAGNOSIS — E78.5 DYSLIPIDEMIA: Primary | ICD-10-CM

## 2024-05-20 NOTE — ASSESSMENT & PLAN NOTE
ASSESSMENT:  -Per cardiology, patient's infarction likely hypertensive in nature; on ASA 81 mg daily.  -Patient's BP is well controlled based on most recent reading from April: 128/70 mmHg  -Working on lipid reduction with increased statin dose as noted above.    RECOMMENDATIONS/PLAN:    CONTINUE Metoprolol Succinate XL 50 mg daily.  CONTINUE Rosuvastatin 10 mg daily.

## 2024-05-20 NOTE — PROGRESS NOTES
Subjective   Patient ID: Karolyn Martinez is a 69 y.o. female who presents for Hyperlipidemia and Hypertension.    Referring Provider:  EVAN Belcher / Katiuska Cardenas MD (PCP)    Patient presents to Clinical Pharmacy at request of PCP office for medication management in regards to hyperlipidemia and hypertension.     Hyperlipidemia  At last PCP visit, patient's Rosuvastatin was increased from 2x/week to daily. Patient has been on this dose for ~2 months with no reported side effects. Last LDL was 123 mg/dL from February.    Hypertension  At last PCP visit, patient's BP was controlled at 122/80 mmHg. Home cuff average was 135/74. Currently only on Metoprolol which was restarted recently. Previously on ACE inhibitor which was stopped due to cough. Advised to measure BP after she takes Metoprolol. Most recent BP from April office visit continues to be controlled at 128/70 mmHg.     Objective     There were no vitals taken for this visit.     Labs  Lab Results   Component Value Date    BILITOT 0.5 02/17/2024    CALCIUM 9.6 02/17/2024    CO2 28 02/17/2024     02/17/2024    CREATININE 0.73 02/17/2024    GLUCOSE 103 (H) 02/17/2024    ALKPHOS 48 02/17/2024    K 5.2 02/17/2024    PROT 6.9 02/17/2024     02/17/2024    AST 31 02/17/2024    ALT 18 02/17/2024    BUN 16 02/17/2024    ANIONGAP 11 02/17/2024    MG 2.25 02/17/2024    PHOS 3.5 01/09/2023    ALBUMIN 4.4 02/17/2024    GFRF 79 08/21/2023     Lab Results   Component Value Date    TRIG 73 02/15/2024    CHOL 198 02/15/2024    LDLCALC 123 (H) 02/15/2024    HDL 60.4 02/15/2024     Lab Results   Component Value Date    HGBA1C 5.4 01/09/2023       Current Outpatient Medications on File Prior to Visit   Medication Sig Dispense Refill    albuterol 90 mcg/actuation inhaler Inhale 2 puffs every 4 hours if needed for shortness of breath or wheezing.      ascorbic acid (Vitamin C) 1,000 mg tablet Take 1 tablet (1,000 mg) by mouth once daily.       cholecalciferol (Vitamin D-3) 50 mcg (2,000 unit) capsule Take 1 capsule (50 mcg) by mouth early in the morning..      coenzyme Q-10 100 mg capsule Take 1 capsule (100 mg) by mouth 2 times a day.      cyanocobalamin (Vitamin B-12) 1,000 mcg tablet Take 1 tablet (1,000 mcg) by mouth once daily.      fish oil concentrate (Omega-3) 120-180 mg capsule Take 1 capsule (1 g) by mouth once daily.      fluticasone (Flonase) 50 mcg/actuation nasal spray Administer into affected nostril(s) every 12 hours.      metoprolol succinate XL (Toprol-XL) 50 mg 24 hr tablet Take 1 tablet (50 mg) by mouth once daily.      omeprazole (PriLOSEC) 40 mg DR capsule Take 1 capsule (40 mg) by mouth once daily in the morning. Take before meals. Do not crush or chew. 90 capsule 3    rosuvastatin (Crestor) 10 mg tablet Take 1 tablet (10 mg) by mouth once daily. 90 tablet 3    sertraline (Zoloft) 50 mg tablet Take 1 tablet (50 mg) by mouth once daily. 90 tablet 0    valACYclovir (Valtrex) 1 gram tablet Take 2 tablets (2,000 mg) by mouth 2 times a day. 12 tablet 3     No current facility-administered medications on file prior to visit.        Assessment/Plan   Problem List Items Addressed This Visit             ICD-10-CM    Coronary artery calcification I25.10, I25.84    Dyslipidemia - Primary E78.5     ASSESSMENT:  -Patient recently increased statin frequency from taking 2 times/week to daily. Statin is moderate intensity. Patient has been tolerating well; reports no muscle pain.   -Discussed need for repeat labwork prior to next PCP visit in September to assess control.    RECOMMENDATIONS/PLAN:    CONTINUE Rosuvastatin 10 mg once daily.  Advised to get repeat lipid panel in August prior to next PCP appointment. Order placed.  If more control needed, can increase Rosuvastatin to 20 mg daily.         H/O: stroke Z86.73     ASSESSMENT:  -Per cardiology, patient's infarction likely hypertensive in nature; on ASA 81 mg daily.  -Patient's BP is well  controlled based on most recent reading from April: 128/70 mmHg  -Working on lipid reduction with increased statin dose as noted above.    RECOMMENDATIONS/PLAN:    CONTINUE Metoprolol Succinate XL 50 mg daily.  CONTINUE Rosuvastatin 10 mg daily.          Other Visit Diagnoses         Codes    Hyperlipidemia, unspecified hyperlipidemia type     E78.5            Nehal Astorga PharmD  Clinical Ambulatory Care Pharmacist  Ph: [phone]    Continue all meds under the continuation of care with the referring provider and clinical pharmacy team.    Clinical Pharmacist follow up: ~3 months or sooner as needed based on clinical intervention  Type of Encounter:  Telephone    Verbal consent to manage patient's drug therapy was obtained from the patient. They were informed they may decline to participate or withdraw from participation in pharmacy services at any time.

## 2024-05-20 NOTE — ASSESSMENT & PLAN NOTE
ASSESSMENT:  -Patient recently increased statin frequency from taking 2 times/week to daily. Statin is moderate intensity. Patient has been tolerating well; reports no muscle pain.   -Discussed need for repeat labwork prior to next PCP visit in September to assess control.    RECOMMENDATIONS/PLAN:    CONTINUE Rosuvastatin 10 mg once daily.  Advised to get repeat lipid panel in August prior to next PCP appointment. Order placed.  If more control needed, can increase Rosuvastatin to 20 mg daily.

## 2024-05-20 NOTE — ASSESSMENT & PLAN NOTE
ASSESSMENT:  -Patient recently increased statin frequency from taking 2 times/week to daily. Statin is moderate intensity. Patient has been tolerating well; reports no muscle pain.   -Discussed need for repeat labwork prior to next PCP visit in September to assess control.    RECOMMENDATIONS/PLAN:    CONTINUE Rosuvastatin 10 mg once daily.  Advised to get repeat lipid panel in August prior to next PCP appointment. If more control needed, can increase Rosuvastatin to 20 mg daily.

## 2024-06-04 ENCOUNTER — APPOINTMENT (OUTPATIENT)
Dept: CARDIOLOGY | Facility: CLINIC | Age: 69
End: 2024-06-04
Payer: MEDICARE

## 2024-06-24 DIAGNOSIS — F32.1 CURRENT MODERATE EPISODE OF MAJOR DEPRESSIVE DISORDER WITHOUT PRIOR EPISODE (MULTI): ICD-10-CM

## 2024-06-24 RX ORDER — SERTRALINE HYDROCHLORIDE 50 MG/1
50 TABLET, FILM COATED ORAL DAILY
Qty: 90 TABLET | Refills: 3 | Status: SHIPPED | OUTPATIENT
Start: 2024-06-24

## 2024-08-08 ENCOUNTER — APPOINTMENT (OUTPATIENT)
Dept: NEUROLOGY | Facility: CLINIC | Age: 69
End: 2024-08-08
Payer: MEDICARE

## 2024-09-10 ENCOUNTER — APPOINTMENT (OUTPATIENT)
Dept: PRIMARY CARE | Facility: CLINIC | Age: 69
End: 2024-09-10
Payer: MEDICARE

## 2024-09-10 VITALS
BODY MASS INDEX: 25.95 KG/M2 | HEART RATE: 64 BPM | SYSTOLIC BLOOD PRESSURE: 121 MMHG | WEIGHT: 141 LBS | OXYGEN SATURATION: 97 % | DIASTOLIC BLOOD PRESSURE: 69 MMHG | HEIGHT: 62 IN

## 2024-09-10 DIAGNOSIS — Z01.419 ENCOUNTER FOR GYNECOLOGICAL EXAMINATION WITHOUT ABNORMAL FINDING: ICD-10-CM

## 2024-09-10 DIAGNOSIS — I47.19 PAT (PAROXYSMAL ATRIAL TACHYCARDIA) (CMS-HCC): ICD-10-CM

## 2024-09-10 DIAGNOSIS — Z13.39 ALCOHOL SCREENING: ICD-10-CM

## 2024-09-10 DIAGNOSIS — R73.01 IFG (IMPAIRED FASTING GLUCOSE): ICD-10-CM

## 2024-09-10 DIAGNOSIS — E55.9 VITAMIN D DEFICIENCY: ICD-10-CM

## 2024-09-10 DIAGNOSIS — E53.8 VITAMIN B12 DEFICIENCY: ICD-10-CM

## 2024-09-10 DIAGNOSIS — E21.1 SECONDARY HYPERPARATHYROIDISM, NON-RENAL (MULTI): ICD-10-CM

## 2024-09-10 DIAGNOSIS — K21.9 GASTROESOPHAGEAL REFLUX DISEASE WITH HIATAL HERNIA: ICD-10-CM

## 2024-09-10 DIAGNOSIS — I10 PRIMARY HYPERTENSION: ICD-10-CM

## 2024-09-10 DIAGNOSIS — Z12.31 ENCOUNTER FOR SCREENING MAMMOGRAM FOR BREAST CANCER: ICD-10-CM

## 2024-09-10 DIAGNOSIS — K44.9 GASTROESOPHAGEAL REFLUX DISEASE WITH HIATAL HERNIA: ICD-10-CM

## 2024-09-10 DIAGNOSIS — G31.84 MILD COGNITIVE IMPAIRMENT: ICD-10-CM

## 2024-09-10 DIAGNOSIS — F32.1 CURRENT MODERATE EPISODE OF MAJOR DEPRESSIVE DISORDER WITHOUT PRIOR EPISODE (MULTI): ICD-10-CM

## 2024-09-10 DIAGNOSIS — Z00.00 ROUTINE ADULT HEALTH MAINTENANCE: Primary | ICD-10-CM

## 2024-09-10 DIAGNOSIS — Z71.89 ADVANCED DIRECTIVES, COUNSELING/DISCUSSION: ICD-10-CM

## 2024-09-10 DIAGNOSIS — B00.1 RECURRENT HERPES LABIALIS: ICD-10-CM

## 2024-09-10 DIAGNOSIS — R93.1 ABNORMAL SCREENING CARDIAC CT: ICD-10-CM

## 2024-09-10 DIAGNOSIS — Z71.89 CARDIAC RISK COUNSELING: ICD-10-CM

## 2024-09-10 DIAGNOSIS — K21.00 GASTROESOPHAGEAL REFLUX DISEASE WITH ESOPHAGITIS, UNSPECIFIED WHETHER HEMORRHAGE: ICD-10-CM

## 2024-09-10 DIAGNOSIS — Z86.73 H/O: STROKE: ICD-10-CM

## 2024-09-10 DIAGNOSIS — E21.3 HYPERPARATHYROIDISM (MULTI): ICD-10-CM

## 2024-09-10 DIAGNOSIS — Z13.89 SCREENING FOR MULTIPLE CONDITIONS: ICD-10-CM

## 2024-09-10 DIAGNOSIS — Z13.9 ENCOUNTER FOR SCREENING INVOLVING SOCIAL DETERMINANTS OF HEALTH (SDOH): ICD-10-CM

## 2024-09-10 PROBLEM — H92.09 REFERRED OTALGIA: Status: RESOLVED | Noted: 2024-04-17 | Resolved: 2024-09-10

## 2024-09-10 PROBLEM — R09.81 CONGESTION OF PARANASAL SINUS: Status: RESOLVED | Noted: 2024-04-17 | Resolved: 2024-09-10

## 2024-09-10 PROBLEM — Z86.19 HISTORY OF FUNGAL INFECTION: Status: ACTIVE | Noted: 2024-04-17

## 2024-09-10 PROBLEM — F29 PSYCHOSIS (MULTI): Status: RESOLVED | Noted: 2024-03-19 | Resolved: 2024-09-10

## 2024-09-10 PROBLEM — R10.30 LOWER ABDOMINAL PAIN: Status: RESOLVED | Noted: 2024-04-17 | Resolved: 2024-09-10

## 2024-09-10 PROBLEM — H92.10 PURULENT OTORRHEA: Status: RESOLVED | Noted: 2024-04-17 | Resolved: 2024-09-10

## 2024-09-10 PROBLEM — R74.01 HIGH ALANINE AMINOTRANSFERASE (ALT) LEVEL: Status: RESOLVED | Noted: 2024-04-17 | Resolved: 2024-09-10

## 2024-09-10 PROBLEM — R79.89 ABNORMAL LIVER FUNCTION TESTS: Status: RESOLVED | Noted: 2022-08-23 | Resolved: 2024-09-10

## 2024-09-10 PROBLEM — T63.441A BEE STING: Status: RESOLVED | Noted: 2024-04-17 | Resolved: 2024-09-10

## 2024-09-10 PROBLEM — F32.A DEPRESSIVE DISORDER: Status: RESOLVED | Noted: 2023-07-23 | Resolved: 2024-09-10

## 2024-09-10 PROBLEM — N63.20 BREAST MASS, LEFT: Status: RESOLVED | Noted: 2022-08-23 | Resolved: 2024-09-10

## 2024-09-10 PROBLEM — H53.2 DIPLOPIA: Status: RESOLVED | Noted: 2024-04-17 | Resolved: 2024-09-10

## 2024-09-10 PROBLEM — K63.5 POLYP OF COLON: Status: RESOLVED | Noted: 2024-04-17 | Resolved: 2024-09-10

## 2024-09-10 PROBLEM — H61.20 IMPACTED CERUMEN: Status: RESOLVED | Noted: 2024-04-17 | Resolved: 2024-09-10

## 2024-09-10 PROBLEM — M85.80 OSTEOPENIA: Status: RESOLVED | Noted: 2024-04-17 | Resolved: 2024-09-10

## 2024-09-10 PROBLEM — K14.0 GLOSSITIS: Status: RESOLVED | Noted: 2024-04-17 | Resolved: 2024-09-10

## 2024-09-10 PROBLEM — R39.9 SYMPTOMS INVOLVING URINARY SYSTEM: Status: RESOLVED | Noted: 2024-04-17 | Resolved: 2024-09-10

## 2024-09-10 PROBLEM — R00.2 PALPITATIONS: Status: RESOLVED | Noted: 2024-04-17 | Resolved: 2024-09-10

## 2024-09-10 PROCEDURE — G0101 CA SCREEN;PELVIC/BREAST EXAM: HCPCS | Performed by: INTERNAL MEDICINE

## 2024-09-10 PROCEDURE — 3074F SYST BP LT 130 MM HG: CPT | Performed by: INTERNAL MEDICINE

## 2024-09-10 PROCEDURE — 90662 IIV NO PRSV INCREASED AG IM: CPT | Performed by: INTERNAL MEDICINE

## 2024-09-10 PROCEDURE — G0444 DEPRESSION SCREEN ANNUAL: HCPCS | Performed by: INTERNAL MEDICINE

## 2024-09-10 PROCEDURE — 1160F RVW MEDS BY RX/DR IN RCRD: CPT | Performed by: INTERNAL MEDICINE

## 2024-09-10 PROCEDURE — G0446 INTENS BEHAVE THER CARDIO DX: HCPCS | Performed by: INTERNAL MEDICINE

## 2024-09-10 PROCEDURE — 99497 ADVNCD CARE PLAN 30 MIN: CPT | Performed by: INTERNAL MEDICINE

## 2024-09-10 PROCEDURE — 99214 OFFICE O/P EST MOD 30 MIN: CPT | Performed by: INTERNAL MEDICINE

## 2024-09-10 PROCEDURE — 1123F ACP DISCUSS/DSCN MKR DOCD: CPT | Performed by: INTERNAL MEDICINE

## 2024-09-10 PROCEDURE — 3008F BODY MASS INDEX DOCD: CPT | Performed by: INTERNAL MEDICINE

## 2024-09-10 PROCEDURE — 1159F MED LIST DOCD IN RCRD: CPT | Performed by: INTERNAL MEDICINE

## 2024-09-10 PROCEDURE — G0439 PPPS, SUBSEQ VISIT: HCPCS | Performed by: INTERNAL MEDICINE

## 2024-09-10 PROCEDURE — G0008 ADMIN INFLUENZA VIRUS VAC: HCPCS | Performed by: INTERNAL MEDICINE

## 2024-09-10 PROCEDURE — G0442 ANNUAL ALCOHOL SCREEN 15 MIN: HCPCS | Performed by: INTERNAL MEDICINE

## 2024-09-10 PROCEDURE — G0136 PR ADM OF SOC DTR ASSESS 5-15 M: HCPCS | Performed by: INTERNAL MEDICINE

## 2024-09-10 PROCEDURE — 3078F DIAST BP <80 MM HG: CPT | Performed by: INTERNAL MEDICINE

## 2024-09-10 PROCEDURE — 1036F TOBACCO NON-USER: CPT | Performed by: INTERNAL MEDICINE

## 2024-09-10 RX ORDER — ASPIRIN 81 MG/1
81 TABLET ORAL DAILY
Start: 2024-09-10 | End: 2025-09-10

## 2024-09-10 SDOH — ECONOMIC STABILITY: FOOD INSECURITY: WITHIN THE PAST 12 MONTHS, YOU WORRIED THAT YOUR FOOD WOULD RUN OUT BEFORE YOU GOT MONEY TO BUY MORE.: NEVER TRUE

## 2024-09-10 SDOH — ECONOMIC STABILITY: INCOME INSECURITY: IN THE LAST 12 MONTHS, WAS THERE A TIME WHEN YOU WERE NOT ABLE TO PAY THE MORTGAGE OR RENT ON TIME?: NO

## 2024-09-10 SDOH — ECONOMIC STABILITY: TRANSPORTATION INSECURITY
IN THE PAST 12 MONTHS, HAS THE LACK OF TRANSPORTATION KEPT YOU FROM MEDICAL APPOINTMENTS OR FROM GETTING MEDICATIONS?: NO

## 2024-09-10 SDOH — ECONOMIC STABILITY: TRANSPORTATION INSECURITY
IN THE PAST 12 MONTHS, HAS LACK OF TRANSPORTATION KEPT YOU FROM MEETINGS, WORK, OR FROM GETTING THINGS NEEDED FOR DAILY LIVING?: NO

## 2024-09-10 SDOH — ECONOMIC STABILITY: FOOD INSECURITY: WITHIN THE PAST 12 MONTHS, THE FOOD YOU BOUGHT JUST DIDN'T LAST AND YOU DIDN'T HAVE MONEY TO GET MORE.: NEVER TRUE

## 2024-09-10 ASSESSMENT — MINI MENTAL STATE EXAM
SAY:  READ THE WORDS ON THE PAGE AND THEN DO WHAT IT SAYS.  THEN HAND THE PERSON THE SHEET WITH CLOSE YOUR EYES ON IT.  IF THE SUBJECT READS AND DOES NOT CLOSE THEIR EYES, REPEAT UP TO THREE TIMES.  SCORE ONLY IF SUBJECT CLOSES EYES.: 3 CORRECT
WHAT IS THE YEAR, SEASON, DATE, DAY, AND MONTH: 5 CORRECT
SHOW: PENCIL [OBJECT] ASK: WHAT IS THIS CALLED?: 2 CORRECT
SPELL THE WORD WORLD FORWARD AND BACKWARDS OR SERIAL 7S: 5 CORRECT
SAY: I WOULD LIKE YOU TO REPEAT THIS PHRASE AFTER ME: NO IFS, ANDS, OR BUTS.: 1 CORRECT
WHAT STATE, COUNTRY, CITY, HOSPITAL, FLOOR: 5 CORRECT
RECALL THE 3 OBJECTS FROM ABOVE (APPLE, TABLE, PENNY) OR (BALL, TREE, FLAG): 0 CORRECT / UNABLE TO SCORE
NAME OR REPEAT 3 OBJECTS - (APPLE, TABLE, PENNY) OR (BALL, TREE, FLAG): 3 CORRECT
PLEASE COPY THIS PICTURE (NOTE ALL 10 ANGLES MUST BE PRESENT AND TWO MUST INTERSECT): 1 CORRECT
SUM ALL MMSE QUESTIONS FOR TOTAL SCORE [OUT OF 30].: 27
HAND THE PERSON A PENCIL AND PAPER. SAY:  WRITE ANY COMPLETE SENTENCE ON THAT PIECE OF PAPER. (NOTE: THE SENTENCE MUST MAKE SENSE.  IGNORE SPELLING ERRORS): 1 CORRECT
PLACE DESIGN, ERASER AND PENCIL IN FRONT OF THE PERSON.  SAY:  COPY THIS DESIGN PLEASE.  SHOW: DESIGN. ALLOW: MULTIPLE TRIES. WAIT UNTIL PERSON IS FINISHED AND HANDS IT BACK. SCORE: ONLY FOR DIAGRAM WITH 4-SIDED FIGURE BETWEEN TWO 5-SIDED FIGURES: 1 CORRECT

## 2024-09-10 ASSESSMENT — PATIENT HEALTH QUESTIONNAIRE - PHQ9
2. FEELING DOWN, DEPRESSED OR HOPELESS: NOT AT ALL
1. LITTLE INTEREST OR PLEASURE IN DOING THINGS: NOT AT ALL
SUM OF ALL RESPONSES TO PHQ9 QUESTIONS 1 AND 2: 0

## 2024-09-10 ASSESSMENT — SOCIAL DETERMINANTS OF HEALTH (SDOH): IN THE PAST 12 MONTHS, HAS THE ELECTRIC, GAS, OIL, OR WATER COMPANY THREATENED TO SHUT OFF SERVICE IN YOUR HOME?: NO

## 2024-09-10 NOTE — Clinical Note
Hello Her PTH is still high in 2/24 despite normal Vit D Calcium OK She was supposed to f/up with you and never did Any changes in POC let me know Will send more labs today

## 2024-09-10 NOTE — PROGRESS NOTES
Annual Medicare Wellness Exam/Comprehensive Problem Focused Follow Up  HPI/CC  Chief Complaint   Patient presents with    Medicare Annual Wellness Visit Subsequent    MMSE     27/30     Restarted on Zoloft  Statin increased since last appt  BP better    in past few months  Has lost weight since last year with effort (160 last year)    Daughter here with her  Still thinks people are coming in her house and stealing things  Couldnt find her Will, thought it was stolen, was just misplaced  Feels people are watching her  Saw Psychiatry  (Copied)  Anxiety:   Current moderate episode of major depressive disorder without prior episode (CMS/HCC)   Mild cognitive impairment   Will need more collaterals, daughter was at work. We will ask daughter to e with her  next conrad   Already has referral to neurology   Sertraline seemed to help with the paranoia, which is not unusual for SSRIs to help with psychotic symptoms associated with cognitive disorders. Will renew it   Suggested healthy life style, encouraged physical exercise, puzzles   Back in one month with daughter   Refer to neuropsychology   Follow up in 1-2 months or sooner if needed    Saw Cardiology in April (reviewed)  Started on ASA  She is not taking, GI AE    MRI brain in 3/24:  There is moderate brain parenchymal volume loss.  Mild nonspecific white matter changes are noted within the cerebral  hemispheres bilaterally which while nonspecific, given the patient's  age, likely represent sequelae of more remote small-vessel ischemic  change.  There is a remote lacunar infarction involving the right caudate head  with mild asymmetric compensatory dilatation of the adjacent right  lateral ventricle. The gradient echo T2 weighted images demonstrate a  curvilinear focus of dark signal on this region suggesting a small  amount of hemosiderin deposition anterior dystrophic  calcification/mineralization.    Never saw Neurology    Labs reviewed:  Component       Latest Ref Rn 2/17/2024   WBC      4.4 - 11.3 x10*3/uL 4.6    nRBC      0.0 - 0.0 /100 WBCs 0.0    RBC      4.00 - 5.20 x10*6/uL 4.57    HEMOGLOBIN      12.0 - 16.0 g/dL 14.0    HEMATOCRIT      36.0 - 46.0 % 43.8    MCV      80 - 100 fL 96    MCH      26.0 - 34.0 pg 30.6    MCHC      32.0 - 36.0 g/dL 32.0    RED CELL DISTRIBUTION WIDTH      11.5 - 14.5 % 12.4    Platelets      150 - 450 x10*3/uL 179    Neutrophils %      40.0 - 80.0 % 66.7    Immature Granulocytes %, Automated      0.0 - 0.9 % 0.2    Lymphocytes %      13.0 - 44.0 % 21.0    Monocytes %      2.0 - 10.0 % 8.2    Eosinophils %      0.0 - 6.0 % 3.0    Basophils %      0.0 - 2.0 % 0.9    Neutrophils Absolute      1.60 - 5.50 x10*3/uL 3.08    Immature Granulocytes Absolute, Automated      0.00 - 0.50 x10*3/uL 0.01    Lymphocytes Absolute      0.80 - 3.00 x10*3/uL 0.97    Monocytes Absolute      0.05 - 0.80 x10*3/uL 0.38    Eosinophils Absolute      0.00 - 0.40 x10*3/uL 0.14    Basophils Absolute      0.00 - 0.10 x10*3/uL 0.04    GLUCOSE      74 - 99 mg/dL 103 (H)    SODIUM      136 - 145 mmol/L 137    POTASSIUM      3.5 - 5.3 mmol/L 5.2    CHLORIDE      98 - 107 mmol/L 103    Bicarbonate      21 - 32 mmol/L 28    Anion Gap      10 - 20 mmol/L 11    Blood Urea Nitrogen      6 - 23 mg/dL 16    Creatinine      0.50 - 1.05 mg/dL 0.73    EGFR      >60 mL/min/1.73m*2 88    Calcium      8.6 - 10.3 mg/dL 9.6    Albumin      3.4 - 5.0 g/dL 4.4    Alkaline Phosphatase      33 - 136 U/L 48    Total Protein      6.4 - 8.2 g/dL 6.9    AST      9 - 39 U/L 31    Bilirubin Total      0.0 - 1.2 mg/dL 0.5    ALT      7 - 45 U/L 18    Color, Urine      Straw, Yellow  Straw    Appearance, Urine      Clear  Clear    Specific Gravity, Urine      1.005 - 1.035  1.003 ! (N)    pH, Urine      5.0, 5.5, 6.0, 6.5, 7.0, 7.5, 8.0  7.0    Protein, Urine      NEGATIVE mg/dL NEGATIVE    Glucose, Urine      NEGATIVE mg/dL NEGATIVE    Blood, Urine      NEGATIVE  NEGATIVE    Ketones,  Urine      NEGATIVE mg/dL NEGATIVE    Bilirubin, Urine      NEGATIVE  NEGATIVE    Urobilinogen, Urine      <2.0 mg/dL <2.0    Nitrite, Urine      NEGATIVE  NEGATIVE    Leukocyte Esterase, Urine      NEGATIVE  NEGATIVE      Component      Latest Ref Rng 2/15/2024   Vitamin B12      211 - 911 pg/mL 289    FOLATE      >5.0 ng/mL 9.0    Parathyroid Hormone, Intact      18.5 - 88.0 pg/mL 104.9 (H)    Vitamin D, 25-Hydroxy, Total      30 - 100 ng/mL 41    Thyroid Stimulating Hormone      0.44 - 3.98 mIU/L 3.06      Component      Latest Ref Rng 2/17/2024   MAGNESIUM      1.60 - 2.40 mg/dL 2.25    Troponin I, High Sensitivity      0 - 13 ng/L 5    Extra Tube Hold for add-ons.    POCT Calcium, Ionized      1.1 - 1.33 mmol/L 1.15        Assessment and Plan:  Problem List Items Addressed This Visit          High    Routine adult health maintenance - Primary    Overview     Influenza 9/27/21, 10/1/22  Pfizer COVID 19 vaccine 1/28/21, 2/18/21, 9/28/21, 4/23/22, 10/1/22  Shingrix Oct 26 2020, Mar  8, 2021          Td 8/6/21       Pneumovax 23 5/5/2020  Prevnar 13 7/26/18  Prevnar 20 8/21/23  Tdap (1) Apr 2, 2012   Mamm 10/15/2004; 8/24/21, 8/23/22, 8/24/23  Cscope 2018 (5yrs); 9/2023 (5yrs)  BMD:8/17/21, 8/18/23 8/8/22: 8/8/22: Current 10-Year ASCVD Risk:  12.44% - Intermediate Risk   11/21 US aorta (-), 7/22 US also (-)         Current Assessment & Plan     Annual Wellness exam completed   Preventive Health History reviewed  Ordered:   Labs    Mammogram               Medium    Abnormal screening cardiac CT    Overview     8/21: 1. Coronary artery calcium score of 328*. (LAD, LCX, RCA, LM) 2. [place] 90th percentile** for age, gender, and race in asymptomatic patients. Coronary Artery  Agatston Score risk:Moderately increased >300   Nuclear stress test (-) 10/21   On statin (self d/c'd)  ASA started by cardiologist in 4/24, caused GI AE         Current Assessment & Plan     Try EC ASA  F/up with cardiologist         Relevant  Medications    aspirin 81 mg EC tablet    Advanced directives, counseling/discussion    Overview     9/10/24: Janey Noonan (Daughter) is her HCPOA  Full Code  Does not want to live in vegetative         Alcohol screening    Overview     09/10/24: 5 minutes spent screening for alcohol misuse  See snapshot (social documentation) for details.           Cardiac risk counseling    Overview     9/10/24:  Current 10-Year ASCVD Risk:  12.5% - Intermediate Risk    On ASA due to CAC and Hx CVA         Current moderate episode of major depressive disorder without prior episode (Multi)    Overview     On Zoloft  Stable  Monitor         Encounter for gynecological examination without abnormal finding    Encounter for screening involving social determinants of health (SDoH)    Overview     09/10/24: 5 minutes spent on SDOH screening.  Specifically: Housing, Food Insecurity, Utilities and Transportatin Needs were evaluated   (See Screenings in Rooming section for documentation)           Encounter for screening mammogram for breast cancer    Relevant Orders    BI mammo bilateral screening tomosynthesis    Gastroesophageal reflux disease with esophagitis    Overview     2018: EGD showed a 2 cm hiatal hernia and LA grade A esophagitis.;  w/HH  on Pepcid prn and PPI daily         RESOLVED: Gastroesophageal reflux disease with hiatal hernia    H/O: stroke    Overview     3/24: MRI brain : There is moderate brain parenchymal volume loss.  Mild nonspecific white matter changes are noted within the cerebral  hemispheres bilaterally which while nonspecific, given the patient's  age, likely represent sequelae of more remote small-vessel ischemic  change.  There is a remote lacunar infarction involving the right caudate head  with mild asymmetric compensatory dilatation of the adjacent right  lateral ventricle. The gradient echo T2 weighted images demonstrate a  curvilinear focus of dark signal on this region suggesting a small  amount of  hemosiderin deposition anterior dystrophic  calcification/mineralization.  Cardiologist started ASA in 4/24         Relevant Medications    aspirin 81 mg EC tablet    Hyperparathyroidism (Multi)    Overview     8/21: Vit D 35  2/24: .9, calcium 9.6, Vit D 41  S/p Endo consult 6/23: secondary hyperparathyroidism due to vitamin D deficiency , now she lowered vitamin D intake to 2000.   Advised to increase back to 4000 IU and recheck PTH and RF in 3 months   - possible underlying primary hyperparathyroidism , currently normocalcemia .          Relevant Orders    PTH, intact    Calcium    Vitamin D 25-Hydroxy,Total (for eval of Vitamin D levels)    IFG (impaired fasting glucose)    Overview     7/22: 103  Managed with diet  Suffered from hypoglycemia in past,symptomatic  Prefers to jsut monitor;         Current Assessment & Plan     Check hba1c         Relevant Orders    Hemoglobin A1c    Mild cognitive impairment    Overview     MMSE 27/30 2/15/24 and 9/10/24         PAT (paroxysmal atrial tachycardia) (CMS-[facility])    Overview      ZioPatch 5/7/18 demonstrated a short run of atrial tachycardia.   Triggered event corresponded to sinus rhythm  on Toprol   Asymptomatic         Relevant Orders    TSH with reflex to Free T4 if abnormal    Primary hypertension    Overview     Goal <130/80  MALDONADO diet & Exercise  ACEi caused cough  Tolerated BB in past, restarted in 2024  3/18/24 Cuff Comparison  Her cuff ave  135/74  Office cuff ave 122/80         Relevant Orders    Comprehensive Metabolic Panel    CBC and Auto Differential    Lipid Panel    Urinalysis with Reflex Microscopic    Recurrent herpes labialis    Overview     Comment on above: Valtrex prn;         Screening for multiple conditions    Overview     Depression screening completed using the PHQ-2 questions with results documented in the chart/encounter  (See Rooming Screenings for documentation)           Secondary hyperparathyroidism, non-renal (Multi)    Overview  "    8/21: Vit D 35  7/22: .8 (was on Vitamin D once daily), previously *87.1 (was on Vitamin D 2 /day)  S/P endo consult         Vitamin B12 deficiency    Overview     2/24: 289         Current Assessment & Plan     Start B12 supplement  Labs in 1 month         Relevant Orders    Vitamin B12    Vitamin D deficiency    Overview     Goal ~40 (hx kidney stones)  On supplement  2/daily keeps her PTH normalized         Relevant Orders    Vitamin D 25-Hydroxy,Total (for eval of Vitamin D levels)         ROS otherwise negative aside from what was mentioned above in HPI.    Vitals  /69   Pulse 64   Ht 1.562 m (5' 1.5\")   Wt 64 kg (141 lb)   SpO2 97%   BMI 26.21 kg/m²   Body mass index is 26.21 kg/m².  Physical Exam  Gen: Alert, NAD  HEENT:  Unremarkable  Neck:  No BONNIE  Respiratory:  Lungs CTAB  Cardiovascular:  Heart RRR  Neuro:  Gross motor and sensory intact  Skin:  No suspicious lesions present  Breast: No masses, or axillary lymphadenopathy  Gyn: Normal pelvic exam: no uterine masses or cervical lesions, or CMT; no vaginal D/C. No ovarian or adnexal masses; No external vaginal or anal/perineal lesions (Pt declined chaperone)    MMSE: 27/30    Patient Care Team:  Katiuska Cardenas MD as PCP - General  Katiuska Cardenas MD as PCP - [facility] ACO Attributed Provider  PRAVEEN Guardado-CNP as Nurse Practitioner (Pulmonary Disease)  Diana Geronimo MD as Referring Physician (Endocrinology)       Health Risk Assessment:  Patient was asked if he/she has any difficulty performing the following activities of daily living:  Preparing nutritious food and eating? No  Grocery shopping? No  Bathing and grooming yourself? No  Getting dressed? No  Using the toilet?No  Using the phone? No  Moving around from place to place (physical ambulation)? No  Doing housework (including laundry) independently? No  Managing finances independently? No  Managing medications independently? No  Doing housework (including laundry) " independently? No    Patient was asked if he/she:  Feels safe in current home environment?: Yes  Uses seatbelt? Yes  Sees the dentist regularly? Yes  Exercises regularly: No  Suffers from depression, stress, anger, loneliness or social isolation, pain, suicidality? No    Dietary issues discussed: Yes  Cognitive Impairment No  Hearing difficulties: Yes  Visual Acuity assessed: No    What is your self-assessment of overall health status and life satisfaction? Good     5 minutes spent on SDOH screening:   Specifically Housing, Food Insecurity, Utilities and Transportatin Needs were evaluated   (See Screenings in Rooming section for documentation)  Food Insecurity: No Food Insecurity (9/10/2024)    Hunger Vital Sign     Worried About Running Out of Food in the Last Year: Never true     Ran Out of Food in the Last Year: Never true     Housing Stability: Unknown (9/10/2024)    Housing Stability Vital Sign     Unable to Pay for Housing in the Last Year: No     Number of Times Moved in the Last Year: Not on file     Homeless in the Last Year: No     Transportation Needs: No Transportation Needs (9/10/2024)    PRAPARE - Transportation     Lack of Transportation (Medical): No     Lack of Transportation (Non-Medical): No       Allergies and Medications  Lisinopril and Penicillins  Current Outpatient Medications   Medication Instructions    albuterol 90 mcg/actuation inhaler 2 puffs, inhalation, Every 4 hours PRN    ascorbic acid (Vitamin C) 1,000 mg tablet 1 tablet, oral, Daily    aspirin 81 mg, oral, Daily    cholecalciferol (Vitamin D-3) 50 mcg (2,000 unit) capsule 1 capsule, oral, Daily    coenzyme Q-10 100 mg capsule 1 capsule, oral, 2 times daily    cyanocobalamin (Vitamin B-12) 1,000 mcg tablet 1 tablet, oral, Daily    fish oil concentrate (Omega-3) 120-180 mg capsule 1 g, oral, Daily    fluticasone (Flonase) 50 mcg/actuation nasal spray nasal, Every 12 hours    metoprolol succinate XL (TOPROL-XL) 50 mg, oral, Daily     omeprazole (PRILOSEC) 40 mg, oral, Daily before breakfast, Do not crush or chew.    rosuvastatin (CRESTOR) 10 mg, oral, Daily    sertraline (ZOLOFT) 50 mg, oral, Daily    valACYclovir (VALTREX) 2,000 mg, oral, 2 times daily       Medications and Supplements  prescribed by me and other practitioners or clinical pharmacist (such as prescriptions, OTC's, herbal therapies and supplements) were reviewed and documented in the medical record.      Active Problem List  Patient Active Problem List   Diagnosis    Abnormal screening cardiac CT    Routine adult health maintenance    History of colon polyps    BMI 26.0-26.9,adult    Cardiac risk counseling    Advanced directives, counseling/discussion    Allergic rhinitis    Anxiety    Bilateral sensorineural hearing loss    Chronic obstructive pulmonary disease (Multi)    Constipation    Current moderate episode of major depressive disorder without prior episode (Multi)    Diastolic dysfunction, left ventricle    Disorder of bone density and structure, unspecified    Diverticulosis of colon    Edentulous    Gastroesophageal reflux disease with esophagitis    Grade I hemorrhoids    Liver cyst    Primary hypertension    Dyslipidemia    IFG (impaired fasting glucose)    Lung nodule    PAT (paroxysmal atrial tachycardia) (CMS-HCC)    Recurrent herpes labialis    Kidney stone    Renal cyst, right    Secondary hyperparathyroidism, non-renal (Multi)    TSH elevation    Vitamin D deficiency    Screening for multiple conditions    Screening for colon cancer    Encounter for screening mammogram for breast cancer    H/O: stroke    Mild cognitive impairment    History of fungal infection    Dry eyes    Hyperparathyroidism (Multi)    Vitamin B12 deficiency    Alcohol screening    Encounter for screening involving social determinants of health (SDoH)    Encounter for gynecological examination without abnormal finding       Comprehensive Medical/Surgical/Social/Family History  Past Medical  History:   Diagnosis Date    Abnormal auditory function study     :  mild to moderate sensorinueural hearing loss in the right ear and a moderate to severe mixed hearing loss in the left. Word recognition was 88% in the right and 32% in the left.    Abnormal MMSE 02/15/2024    27/30    Abnormal screening cardiac CT 2022: 1. Coronary artery calcium score of 328*. (LAD, LCX, RCA, LM) 2. [place] 90th percentile** for age, gender, and race in asymptomatic patients. Coronary Artery  Agatston Score risk:Moderately increased >300 Nuclear stress test (-) 10/21 On statin    H/O bone density study     21: T score -1.9 FRAX* 10-year Probability of Fracture Based on femoral neck BMD: DualFemur (Right) Major Osteoporotic Fracture: 11.1% Hip Fracture:                1.9%    H/O bone density study 2023    LOwest T score -1.7; 10-year Fracture Risk: Major Osteoporotic Fracture  10.3 Hip Fracture                        1.7    H/O cardiovascular stress test     : 1. No nuclear evidence of pharmacologic stress-induced ischemia or scar. 2. Vigorous LV function, EF greater than 75%. 3. Based on these findings low likelihood of obstructive coronary artery disease.    H/O chest x-ray     2017: normal :normal    H/O CT scan of chest     CT Lung screenin/22: 1.  11 mm pleural-based nodular opacity in the right upper lobe which is likely an area of scarring, however recommend follow-up. 2. Moderate upper lung predominant emphysema. 3. Moderate coronary artery calcification. Severe calcification of the thoracic/abdominal aorta. : 1. Right apical pleural thickening is stable/post inflammatory/infectious.    H/O CT scan of chest 2022    1. Right apical pleural thickening is stable and felt to be post inflammatory/infectious. There are no suspicious parenchymal lung nodules. Recommendations are for continued 1 year low-dose chest CT for surveillance. 2. Severe coronary artery calcifications and  correlate with coronary artery disease    H/O CT scan of chest 11/14/2023    1. Stable asymmetric postinflammatory scarring in the posterior right apex and stable presumably benign tiny nodule of the subpleural right lateral lung base dating back to the baseline 08/23/2022 exam. Advise ongoing annual low-dose lung cancer screening CT. 2. At least moderate, scattered atherosclerotic changes, including calcification seen in the coronary arteries.    H/O CT scan of chest     (Cont)  A nuclear medicine stress test was performed 09/03/2021 per chart review. See that result and obtain follow-up as clinically appropriate. 3. 6 mm nonobstructing right renal calculus which was present on the baseline 08/23/2022 examination, but motion blurred, limiting direct comparison    H/O diagnostic ultrasound     8/2021 RUQ: No detected cause for patient's symptoms. Liver demonstrates normal size and morphology with a 1 cm simple cyst. 14 mm simple cyst right kidney. 7/1/22: No solid or cystic lesions are identified within the liver on today's examination. . Right nephrolithiasis, 1cm Small right renal cyst, 1.7cm 1/3/23: US kidney: Small cystic lesion suggested within the midpole of the right kidney    H/O diagnostic ultrasound     1/23: US kidney: Small cystic lesion within the midpole of the right kidney is better demonstrated on comparison exam.   Bilateral nephrolithiasis.    H/O Doppler ultrasound     11/21: US aorta (-)    H/O echocardiogram     5/21/18: normal LV size and function, EF 60-65%. Grade 1 diastolic dysfunction. Normal RV size and function. Trace TR.    H/O exercise stress test     5/7/18:no ST or T changes. Duke treadmill score of 7    H/O magnetic resonance imaging of brain and brain stem 03/06/2024    There is moderate brain parenchymal volume loss.    Mild nonspecific white matter changes are noted within the cerebral hemispheres bilaterally which while nonspecific, given the patient's age, likely represent  sequelae of more remote small-vessel ischemic change.    H/O magnetic resonance imaging of brain and brain stem     (cont)There is a remote lacunar infarction involving the right caudate head with mild asymmetric compensatory dilatation of the adjacent right lateral ventricle. The gradient echo T2 weighted images demonstrate a curvilinear focus of dark signal on this region suggesting a small amount of hemosiderin deposition anterior dystrophic calcification/mineralization    History of Holter monitoring     ZioPatch 18 demonstrated a short run of atrial tachycardia. Triggered event corresponded to sinus rhythm.    History of PFTs 2021    2018: Severe COPD     Past Surgical History:   Procedure Laterality Date    CHOLECYSTECTOMY  2020    Cholecystectomy    COLONOSCOPY  2021 diverticulosis in the descending colon, sigmoid colon and  hemorrhoids.    COLONOSCOPY  2023    Two small polyps in the sigmoid colon and in the                         cecum, removed with a cold snare. Resected and                         retrieved    ESOPHAGOGASTRODUODENOSCOPY  2021: EGD showed a 2 cm hiatal hernia and LA grade A esophagitis.    HYSTERECTOMY  2021    s/p MARCIO, ovaries remain    MASTOID SURGERY  2021  Left tympanoplasty, mastoidectomy, posterior atticotomy, facial recess, composite cartilage/ perichondrium graft.Cultures from OR grew Aspergillus Niger    TYMPANOPLASTY  2021  Left tympanoplasty, mastoidectomy, posterior atticotomy, facial recess, composite cartilage/ perichondrium graft.Cultures from OR grew Aspergillus Niger     Social History     Social History Narrative    Social History:    , 2 kids    Retired    Former smoker: Quit , Age [age] 1.5 packs/day    No ETOH at all    ---    Family History:    F; Lung CA ( age 78)    M: Colon CA ( age 70s)    B: AML Leukemia( age 74)    S: Ovarian CA ( 72)    S: CAD  ( 69)    No living siblings    D: Janey Noonan    D: Susan Lance         Tobacco/Alcohol/Opioid use, as well as Illicit Drug Use was screened for/reviewed and documented in Social Documentation section of the chart and medication list as appropriate     Depression Screening  Depression screening completed using the PHQ-2 questions, with results documented in the chart/encounter (5 min spent on this).  (See Rooming Screening section for documentation, and/or progress note for additional information)    Cardiac Risk Assessment (15 minutes spent on this)  The 10-year ASCVD risk score (Arcelia AVILES, et al., 2019) is: 12.4%    Values used to calculate the score:      Age: 69 years      Sex: Female      Is Non- : No      Diabetic: No      Tobacco smoker: No      Systolic Blood Pressure: 121 mmHg      Is BP treated: Yes      HDL Cholesterol: 60.4 mg/dL      Total Cholesterol: 198 mg/dL    Cardiovascular risk was discussed and, if needed, lifestyle modifications recommended, including nutritional choices, exercise, and elimination of habits contributing to risk. We agreed on a plan to reduce the current cardiovascular risk based on above discussion as needed.     Aspirin use/disuse was discussed and documented in the Problem List of the medical record (under Cardiac Risk Counseling) after reviewing the updated guidelines below:  Consider low dose Aspirin ( mg) use if the benefit for cardiovascular disease prevention outweighs risk for bleeding complications.   Discussed that in general, low dose ASA should be considered:  In patients WITHOUT prior MI/stroke/PAD (primary prevention):   a. Age <60: Use if 10-year cardiovascular disease risk >20%, with discussion of risks and benefits with patient  b. Age 60-<70: Use if 10-year cardiovascular disease risk >20% and low bleeding (e.g., gastrointestinal) risk, with discussion of risks and benefits with patient  c. Age >=70: Do not  use    In patients WITH prior MI/stroke/PAD (secondary prevention):   Generally use unless extremely high bleeding (e.g., gastrointenstinal) risk, with discussion of risks and benefits with patient    Advance Directives Discussion  Advanced Care Planning (including a Living Will, Healthcare POA, as well as specific end of life choices and/or directives), was discussed with the patient and/or surrogate, voluntarily, and details of that discussion documented in the Problem List (under Advanced Directives Discussion) of the medical record.   (16 min spent discussing above)     During the course of the visit the patient was educated and counseled about age appropriate screening and preventive services.   Completed preventive screenings were documented in the chart (see Routine Health Maintenance in Problem List) and orders were placed for outstanding screenings/procedures as documented in the Assessment and Plan.    Patient Instructions (the written plan) was given to the patient at check out as part of the AVS.

## 2024-09-13 ENCOUNTER — PATIENT MESSAGE (OUTPATIENT)
Dept: PRIMARY CARE | Facility: CLINIC | Age: 69
End: 2024-09-13
Payer: MEDICARE

## 2024-09-13 DIAGNOSIS — F32.1 CURRENT MODERATE EPISODE OF MAJOR DEPRESSIVE DISORDER WITHOUT PRIOR EPISODE (MULTI): ICD-10-CM

## 2024-09-13 DIAGNOSIS — Z86.79 H/O: HYPERTENSION: ICD-10-CM

## 2024-09-13 RX ORDER — METOPROLOL SUCCINATE 50 MG/1
50 TABLET, EXTENDED RELEASE ORAL DAILY
Qty: 90 TABLET | Refills: 3 | Status: SHIPPED | OUTPATIENT
Start: 2024-09-13

## 2024-09-13 RX ORDER — SERTRALINE HYDROCHLORIDE 50 MG/1
50 TABLET, FILM COATED ORAL DAILY
Qty: 90 TABLET | Refills: 3 | Status: SHIPPED | OUTPATIENT
Start: 2024-09-13

## 2024-09-16 ENCOUNTER — APPOINTMENT (OUTPATIENT)
Dept: PHARMACY | Facility: HOSPITAL | Age: 69
End: 2024-09-16
Payer: MEDICARE

## 2024-09-16 DIAGNOSIS — Z86.73 H/O: STROKE: ICD-10-CM

## 2024-09-16 DIAGNOSIS — E78.5 DYSLIPIDEMIA: Primary | ICD-10-CM

## 2024-09-16 NOTE — ASSESSMENT & PLAN NOTE
ASSESSMENT OF SMBP MEASUREMENTS  Highest reading: ~130/75 mmHg  Average: 120s/70s  mmHg    Patient's goal BP < 130/80.   Rationale for plan: Patient is well controlled on current therapy with metoprolol. If patient blood pressure becomes uncontrolled, would recommend addition of ARB given history of stroke and side effects to ACEI.  CrCl cannot be calculated (Patient's most recent lab result is older than the maximum 3 days allowed.).    Medication Changes:  Continue  Metoprolol succinate 50 mg daily    Monitoring and Education Discussed:  Eat right: Your diet should be rich in fruits, vegetables, potassium, and low-fat dairy products. You should also reduce your intake of sodium and fats, particularly saturated fats.  Maintain a healthy weight: Try to achieve and maintain a healthy weight. If you are unsure what this means for you, please contact our clinic.  Exercise: Try to get at least 30 minutes of aerobic exercise every day.  Moderate your alcohol consumption: Limit your alcohol intake to one drink per day.  Monitor your blood pressure: You should check your blood pressure regularly. Notify our clinic if your blood pressure readings are consistently higher than recommended.

## 2024-09-16 NOTE — ASSESSMENT & PLAN NOTE
Undetermined control with last clinic  and  on 2/2024- due for repeat after increased statin. Secondary prevention with LDL goal < 100 mg/dL.     Rationale for plan: The patient was reminded to get repeat labs prior to cardiologist appointment. She has been on daily statin therapy for months and should see reflected changes in lipids on next panel. Will recommend increase in dose if required pending panel results. For now, no adverse effects and will continue current therapy.  CrCl cannot be calculated (Patient's most recent lab result is older than the maximum 3 days allowed.).  LFTs are normal   Lab Results   Component Value Date    ALT 18 02/17/2024    AST 31 02/17/2024    ALKPHOS 48 02/17/2024    BILITOT 0.5 02/17/2024        Medication Changes:  Continue  Rosuvastatin 10 mg daily    Future Considerations:  May increase up to rosuvastatin 40 mg daily if lipid panel warrants adjustment.    Lifestyle Modifications  Continue active lifestyle.  Focus on implementing dietary changes as discussed.  Recheck lipid panel in the next 1 week.

## 2024-09-16 NOTE — PROGRESS NOTES
"  Clinical Pharmacy Appointment    Patient ID: Karolyn Martinez is a 69 y.o. female who presents for Hyperlipidemia and Hypertension.    Pt is here for Follow Up appointment.     Referring Provider: Cortney Mcallister A*  PCP: Katiuska Cardenas MD   Last visit with PCP: 9/10/2024   Next visit with PCP: 9/25/2025      Subjective     Interval History  Reminded patient to get fasting labs done as ordered prior to cardiologist visit on 9/26    HPI  HYPERTENSION ASSESSMENT    Medication Therapy  Current Medications:   Metoprolol succinate 50 mg daily  Previous Medications: lisinopril- cough/rash listed in allergy     Clarifications to above regimen: none   Adverse Effects: no complaints     Home BP Monitoring  BP Cuff Type: Wrist monitor  Cuff Validated? Yes, reads slightly high compared to office readings    Current home BP readings: highest 130/70-75 mmHg    BP Readings from Last 3 Encounters:   09/10/24 121/69   04/18/24 128/70   03/19/24 147/84       Lifestyle  Diet: 2 meals/day.   BK: breakfast sandwich (bagel, egg, cheese, jackson or sausage)  LN: if anything a sandwich  Drinks: coffee in morning, no soda  Physical Activity: \"always on the go\", watches grandchildren often    Sodium Intake:  caffeine intake: coffee 1-2 cups per day, has cut back on intake and no longer drinks soda    Risk Assessment  Major Risk Factors Include: Hypertension  Dyslipidemia  Age > 55 (men) or > 65 (women)  Known target organ damage: Stroke or TIA    Secondary Prevention  ASCVD Risk:   The 10-year ASCVD risk score (Arcelia AVILES, et al., 2019) is: 12.4%    Values used to calculate the score:      Age: 69 years      Sex: Female      Is Non- : No      Diabetic: No      Tobacco smoker: No      Systolic Blood Pressure: 121 mmHg      Is BP treated: Yes      HDL Cholesterol: 60.4 mg/dL      Total Cholesterol: 198 mg/dL  On Statin?: Yes, rosuvastatin 10 mg daily  Immunizations Needed: RSV  Tobacco Use: former smoker, quit "       HYPERLIPIDEMIA ASSESSMENT  Medication Therapy  Current Medications: rosuvastatin 10 mg daily    Adherence: Takes medication as directed and reports no missed doses  Adverse Effects: no complaints    Lipid Panel Review  Lab Results   Component Value Date    TRIG 73 02/15/2024    CHOL 198 02/15/2024    LDLF 107 (H) 2023    LDLCALC 123 (H) 02/15/2024    HDL 60.4 02/15/2024     Secondary prevention  LDL Goal: < 70 mg/dL    Lifestyle  See above    Medication Reconciliation:  No reported changes    Drug Interactions  The following drug interactions were noted: additive bleed risk on aspirin, fish oil, and sertraline    Medication System Management  Patient's preferred pharmacy: [facility]  Adherence/Organization: appropriate  Affordability/Accessibility: no concerns      Objective   Allergies   Allergen Reactions    Lisinopril Rash    Penicillins Rash     Social History     Social History Narrative    Social History:    , 2 kids    Retired    Former smoker: Quit , Age [age] 1.5 packs/day    No ETOH at all    ---    Family History:    F; Lung CA ( age 78)    M: Colon CA ( age 70s)    B: AML Leukemia( age 74)    S: Ovarian CA ( 72)    S: CAD ( 69)    No living siblings    D: Janey Noonan    D: Susan Lance      Medication Review  Current Outpatient Medications   Medication Instructions    albuterol 90 mcg/actuation inhaler 2 puffs, inhalation, Every 4 hours PRN    ascorbic acid (Vitamin C) 1,000 mg tablet 1 tablet, oral, Daily    aspirin 81 mg, oral, Daily    cholecalciferol (Vitamin D-3) 50 mcg (2,000 unit) capsule 1 capsule, oral, Daily    coenzyme Q-10 100 mg capsule 1 capsule, oral, 2 times daily    cyanocobalamin (Vitamin B-12) 1,000 mcg tablet 1 tablet, oral, Daily    fish oil concentrate (Omega-3) 120-180 mg capsule 1 g, oral, Daily    fluticasone (Flonase) 50 mcg/actuation nasal spray nasal, Every 12 hours    metoprolol succinate XL (TOPROL-XL) 50 mg,  oral, Daily    omeprazole (PRILOSEC) 40 mg, oral, Daily before breakfast, Do not crush or chew.    rosuvastatin (CRESTOR) 10 mg, oral, Daily    sertraline (ZOLOFT) 50 mg, oral, Daily    valACYclovir (VALTREX) 2,000 mg, oral, 2 times daily      Vitals  BP Readings from Last 2 Encounters:   09/10/24 121/69   04/18/24 128/70     BMI Readings from Last 1 Encounters:   09/10/24 26.21 kg/m²      Labs  A1C  Lab Results   Component Value Date    HGBA1C 5.4 01/09/2023     BMP  Lab Results   Component Value Date    CALCIUM 9.6 02/17/2024     02/17/2024    K 5.2 02/17/2024    CO2 28 02/17/2024     02/17/2024    BUN 16 02/17/2024    CREATININE 0.73 02/17/2024    EGFR 88 02/17/2024     LFTs  Lab Results   Component Value Date    ALT 18 02/17/2024    AST 31 02/17/2024    ALKPHOS 48 02/17/2024    BILITOT 0.5 02/17/2024     FLP  Lab Results   Component Value Date    TRIG 73 02/15/2024    CHOL 198 02/15/2024    LDLF 107 (H) 08/21/2023    LDLCALC 123 (H) 02/15/2024    HDL 60.4 02/15/2024     Urine Microalbumin  Lab Results   Component Value Date    MICROALBCREA SEE COMMENT 08/21/2023     Weight Management  Wt Readings from Last 3 Encounters:   09/10/24 64 kg (141 lb)   04/18/24 64.4 kg (142 lb)   03/19/24 64 kg (141 lb)      There is no height or weight on file to calculate BMI.     Assessment/Plan   Problem List Items Addressed This Visit       Dyslipidemia - Primary     Undetermined control with last clinic  and  on 2/2024- due for repeat after increased statin. Secondary prevention with LDL goal < 100 mg/dL.     Rationale for plan: The patient was reminded to get repeat labs prior to cardiologist appointment. She has been on daily statin therapy for months and should see reflected changes in lipids on next panel. Will recommend increase in dose if required pending panel results. For now, no adverse effects and will continue current therapy.  CrCl cannot be calculated (Patient's most recent lab result is  older than the maximum 3 days allowed.).  LFTs are normal   Lab Results   Component Value Date    ALT 18 02/17/2024    AST 31 02/17/2024    ALKPHOS 48 02/17/2024    BILITOT 0.5 02/17/2024        Medication Changes:  Continue  Rosuvastatin 10 mg daily    Future Considerations:  May increase up to rosuvastatin 40 mg daily if lipid panel warrants adjustment.    Lifestyle Modifications  Continue active lifestyle.  Focus on implementing dietary changes as discussed.  Recheck lipid panel in the next 1 week.           H/O: stroke     ASSESSMENT OF SMBP MEASUREMENTS  Highest reading: ~130/75 mmHg  Average: 120s/70s  mmHg    Patient's goal BP < 130/80.   Rationale for plan: Patient is well controlled on current therapy with metoprolol. If patient blood pressure becomes uncontrolled, would recommend addition of ARB given history of stroke and side effects to ACEI.  CrCl cannot be calculated (Patient's most recent lab result is older than the maximum 3 days allowed.).    Medication Changes:  Continue  Metoprolol succinate 50 mg daily    Monitoring and Education Discussed:  Eat right: Your diet should be rich in fruits, vegetables, potassium, and low-fat dairy products. You should also reduce your intake of sodium and fats, particularly saturated fats.  Maintain a healthy weight: Try to achieve and maintain a healthy weight. If you are unsure what this means for you, please contact our clinic.  Exercise: Try to get at least 30 minutes of aerobic exercise every day.  Moderate your alcohol consumption: Limit your alcohol intake to one drink per day.  Monitor your blood pressure: You should check your blood pressure regularly. Notify our clinic if your blood pressure readings are consistently higher than recommended.              Clinical Pharmacist follow-up: 10/7 11AM, Telehealth visit    Continue all meds under the continuation of care with the referring provider and clinical pharmacy team.    Thank you,  Adrianna Hawkins,  PharmD  Clinical Pharmacist  [phone]    Verbal consent to manage patient's drug therapy was obtained from the patient. They were informed they may decline to participate or withdraw from participation in pharmacy services at any time.

## 2024-09-20 ENCOUNTER — LAB (OUTPATIENT)
Dept: LAB | Facility: LAB | Age: 69
End: 2024-09-20
Payer: MEDICARE

## 2024-09-20 DIAGNOSIS — R73.01 IFG (IMPAIRED FASTING GLUCOSE): ICD-10-CM

## 2024-09-20 DIAGNOSIS — E21.3 HYPERPARATHYROIDISM (MULTI): ICD-10-CM

## 2024-09-20 DIAGNOSIS — E53.8 VITAMIN B12 DEFICIENCY: ICD-10-CM

## 2024-09-20 DIAGNOSIS — E78.5 DYSLIPIDEMIA: ICD-10-CM

## 2024-09-20 LAB
25(OH)D3 SERPL-MCNC: 38 NG/ML (ref 30–100)
CALCIUM SERPL-MCNC: 9.8 MG/DL (ref 8.6–10.3)
CHOLEST SERPL-MCNC: 144 MG/DL (ref 0–199)
CHOLESTEROL/HDL RATIO: 2.3
EST. AVERAGE GLUCOSE BLD GHB EST-MCNC: 114 MG/DL
HBA1C MFR BLD: 5.6 %
HDLC SERPL-MCNC: 61.7 MG/DL
LDLC SERPL CALC-MCNC: 71 MG/DL
NON HDL CHOLESTEROL: 82 MG/DL (ref 0–149)
PTH-INTACT SERPL-MCNC: 106 PG/ML (ref 18.5–88)
TRIGL SERPL-MCNC: 57 MG/DL (ref 0–149)
VIT B12 SERPL-MCNC: 258 PG/ML (ref 211–911)
VLDL: 11 MG/DL (ref 0–40)

## 2024-09-20 PROCEDURE — 36415 COLL VENOUS BLD VENIPUNCTURE: CPT

## 2024-09-20 PROCEDURE — 83970 ASSAY OF PARATHORMONE: CPT

## 2024-09-20 PROCEDURE — 82306 VITAMIN D 25 HYDROXY: CPT

## 2024-09-20 PROCEDURE — 82607 VITAMIN B-12: CPT

## 2024-09-20 PROCEDURE — 82310 ASSAY OF CALCIUM: CPT

## 2024-09-20 PROCEDURE — 83036 HEMOGLOBIN GLYCOSYLATED A1C: CPT

## 2024-09-20 PROCEDURE — 80061 LIPID PANEL: CPT

## 2024-09-23 ENCOUNTER — APPOINTMENT (OUTPATIENT)
Dept: PRIMARY CARE | Facility: CLINIC | Age: 69
End: 2024-09-23
Payer: MEDICARE

## 2024-09-23 DIAGNOSIS — E53.8 VITAMIN B12 DEFICIENCY: Primary | ICD-10-CM

## 2024-09-23 PROCEDURE — 96372 THER/PROPH/DIAG INJ SC/IM: CPT | Performed by: NURSE PRACTITIONER

## 2024-09-23 RX ORDER — CYANOCOBALAMIN 1000 UG/ML
1000 INJECTION, SOLUTION INTRAMUSCULAR; SUBCUTANEOUS
Status: DISCONTINUED | OUTPATIENT
Start: 2024-09-23 | End: 2024-09-24

## 2024-09-23 RX ORDER — CYANOCOBALAMIN 1000 UG/ML
1000 INJECTION, SOLUTION INTRAMUSCULAR; SUBCUTANEOUS ONCE
Status: COMPLETED | OUTPATIENT
Start: 2024-09-23 | End: 2024-09-23

## 2024-09-24 ENCOUNTER — CLINICAL SUPPORT (OUTPATIENT)
Dept: PRIMARY CARE | Facility: CLINIC | Age: 69
End: 2024-09-24
Payer: MEDICARE

## 2024-09-24 DIAGNOSIS — E53.8 VITAMIN B12 DEFICIENCY: ICD-10-CM

## 2024-09-24 PROCEDURE — 96372 THER/PROPH/DIAG INJ SC/IM: CPT | Performed by: NURSE PRACTITIONER

## 2024-09-24 RX ORDER — CYANOCOBALAMIN 1000 UG/ML
1000 INJECTION, SOLUTION INTRAMUSCULAR; SUBCUTANEOUS DAILY
Status: SHIPPED | OUTPATIENT
Start: 2024-09-24 | End: 2024-09-30

## 2024-09-25 ENCOUNTER — CLINICAL SUPPORT (OUTPATIENT)
Dept: PRIMARY CARE | Facility: CLINIC | Age: 69
End: 2024-09-25
Payer: MEDICARE

## 2024-09-25 DIAGNOSIS — E53.8 VITAMIN B12 DEFICIENCY: ICD-10-CM

## 2024-09-25 PROCEDURE — 96372 THER/PROPH/DIAG INJ SC/IM: CPT | Performed by: NURSE PRACTITIONER

## 2024-09-26 ENCOUNTER — APPOINTMENT (OUTPATIENT)
Dept: CARDIOLOGY | Facility: CLINIC | Age: 69
End: 2024-09-26
Payer: MEDICARE

## 2024-09-26 ENCOUNTER — CLINICAL SUPPORT (OUTPATIENT)
Dept: PRIMARY CARE | Facility: CLINIC | Age: 69
End: 2024-09-26
Payer: MEDICARE

## 2024-09-26 VITALS
HEIGHT: 62 IN | WEIGHT: 140 LBS | SYSTOLIC BLOOD PRESSURE: 118 MMHG | DIASTOLIC BLOOD PRESSURE: 70 MMHG | HEART RATE: 68 BPM | BODY MASS INDEX: 25.76 KG/M2 | OXYGEN SATURATION: 97 %

## 2024-09-26 DIAGNOSIS — E53.8 VITAMIN B12 DEFICIENCY: ICD-10-CM

## 2024-09-26 DIAGNOSIS — E78.5 DYSLIPIDEMIA: ICD-10-CM

## 2024-09-26 DIAGNOSIS — Z86.73 H/O: STROKE: ICD-10-CM

## 2024-09-26 DIAGNOSIS — I25.10 CORONARY ARTERY CALCIFICATION: Primary | ICD-10-CM

## 2024-09-26 DIAGNOSIS — I25.84 CORONARY ARTERY CALCIFICATION: Primary | ICD-10-CM

## 2024-09-26 PROBLEM — R93.1 ABNORMAL SCREENING CARDIAC CT: Status: RESOLVED | Noted: 2023-07-23 | Resolved: 2024-09-26

## 2024-09-26 PROCEDURE — 1160F RVW MEDS BY RX/DR IN RCRD: CPT | Performed by: INTERNAL MEDICINE

## 2024-09-26 PROCEDURE — 99214 OFFICE O/P EST MOD 30 MIN: CPT | Performed by: INTERNAL MEDICINE

## 2024-09-26 PROCEDURE — 3074F SYST BP LT 130 MM HG: CPT | Performed by: INTERNAL MEDICINE

## 2024-09-26 PROCEDURE — 96372 THER/PROPH/DIAG INJ SC/IM: CPT | Performed by: NURSE PRACTITIONER

## 2024-09-26 PROCEDURE — 1126F AMNT PAIN NOTED NONE PRSNT: CPT | Performed by: INTERNAL MEDICINE

## 2024-09-26 PROCEDURE — 1123F ACP DISCUSS/DSCN MKR DOCD: CPT | Performed by: INTERNAL MEDICINE

## 2024-09-26 PROCEDURE — 3008F BODY MASS INDEX DOCD: CPT | Performed by: INTERNAL MEDICINE

## 2024-09-26 PROCEDURE — 1159F MED LIST DOCD IN RCRD: CPT | Performed by: INTERNAL MEDICINE

## 2024-09-26 PROCEDURE — 1036F TOBACCO NON-USER: CPT | Performed by: INTERNAL MEDICINE

## 2024-09-26 PROCEDURE — 3078F DIAST BP <80 MM HG: CPT | Performed by: INTERNAL MEDICINE

## 2024-09-26 ASSESSMENT — ENCOUNTER SYMPTOMS
OCCASIONAL FEELINGS OF UNSTEADINESS: 0
LOSS OF SENSATION IN FEET: 0
DEPRESSION: 0

## 2024-09-26 ASSESSMENT — PAIN SCALES - GENERAL: PAINLEVEL: 0-NO PAIN

## 2024-09-26 NOTE — PROGRESS NOTES
"Chief Complaint:   No chief complaint on file.     History Of Present Illness:    Karolyn Martinez is a 69 y.o. female with a history of dyslipidemia, coronary artery calcification, palpitations (likely secondary to paroxysmal SVT), prior tobacco use, and a family history of premature CAD here for follow-up.    Overall Karolyn is doing okay.  She denies any exertional chest pain or shortness of breath.  Her daughter is with her today.    Nuclear stress test 9/30/2021: No evidence of ischemia or scar.  EF greater than 75%.    CT calcium score August 2021: Total score 328  LM 16    LCx 72  RCA 45  Ascending aorta measuring 3.2 cm    ZioPatch 5/7/18 demonstrated a short run of atrial tachycardia. Triggered event corresponded to sinus rhythm.     Stress test 5/7/18 demonstrating no ST or T changes. Chavez treadmill score of 7     Echocardiogram 5/21/18 demonstrating normal LV size and function, EF 60-65%. Grade 1 diastolic dysfunction. Normal RV size and function. Trace TR.       Allergies:  Lisinopril and Penicillins    Outpatient Medications:  Current Outpatient Medications   Medication Instructions    albuterol 90 mcg/actuation inhaler 2 puffs, inhalation, Every 4 hours PRN    aspirin 81 mg, oral, Daily    cholecalciferol (Vitamin D-3) 50 mcg (2,000 unit) capsule 1 capsule, oral, Daily    coenzyme Q-10 100 mg capsule 1 capsule, oral, 2 times daily    cyanocobalamin (Vitamin B-12) 1,000 mcg tablet 1 tablet, oral, Daily    fluticasone (Flonase) 50 mcg/actuation nasal spray nasal, Every 12 hours    metoprolol succinate XL (TOPROL-XL) 50 mg, oral, Daily    omeprazole (PRILOSEC) 40 mg, oral, Daily before breakfast, Do not crush or chew.    rosuvastatin (CRESTOR) 10 mg, oral, Daily    sertraline (ZOLOFT) 50 mg, oral, Daily    valACYclovir (VALTREX) 2,000 mg, oral, 2 times daily       Last Recorded Vitals:  Visit Vitals  /70 (BP Location: Right arm, Patient Position: Sitting)   Pulse 68   Ht 1.575 m (5' 2\")   Wt " 63.5 kg (140 lb)   SpO2 97%   BMI 25.61 kg/m²   OB Status Postmenopausal   Smoking Status Former   BSA 1.67 m²      LASTWT(3):   Wt Readings from Last 3 Encounters:   09/26/24 63.5 kg (140 lb)   09/10/24 64 kg (141 lb)   04/18/24 64.4 kg (142 lb)       Physical Exam:  In general: alert and in no acute distress.   HEENT: Carotid upstrokes normal with no bruits. JVP is normal.   Pulmonary: Clear to auscultation bilaterally.  Cardiovascular: S1,S2, regular. No appreciable murmurs, rubs or gallops.   Lower extremities: Warm. 2+ distal pulses. No edema.     Last Labs:  CBC -  Recent Labs     02/17/24  0608 02/15/24  0839 08/21/23  1031   WBC 4.6 5.0 4.8   HGB 14.0 14.3 14.3   HCT 43.8 44.1 44.1    204 187   MCV 96 95 95       CMP -  Recent Labs     02/17/24  0608 02/15/24  0839 08/21/23  1031    141 141   K 5.2 5.1 4.4    104 103   CO2 28 31 30   ANIONGAP 11 11 12   BUN 16 12 17   CREATININE 0.73 0.87 0.80   EGFR 88 71  --    MG 2.25  --   --      Recent Labs     02/17/24  0608 02/15/24  0839 08/21/23  1031   ALBUMIN 4.4 4.6 4.4   ALKPHOS 48 51 50   ALT 18 17 14   AST 31 20 18   BILITOT 0.5 0.6 0.5       LIPID PANEL -   Recent Labs     09/20/24  0751 02/15/24  0839 08/21/23  1031 07/01/22  0758 09/30/21  0715   CHOL 144 198 175 180 141   LDLCALC 71 123*  --   --   --    LDLF  --   --  107* 116* 80   HDL 61.7 60.4 56.1 52.0 49.0   TRIG 57 73 61 61 59       Recent Labs     09/20/24  0751 01/09/23  0705   HGBA1C 5.6 5.4           Assessment/Plan   1) coronary artery calcification: Mildly to moderately elevated score 2021 with subsequent stress testing demonstrating low likelihood of flow-limiting coronary disease.  Once again we will continue with lifestyle and risk factor modification.    2) hypertension: Blood pressure well-controlled    3) dyslipidemia: Started on statin therapy and LDL has been very well-controlled.    4) CVA: With a lacunar infarct it is most likely at this was hypertensive event.   Although we talked about starting aspirin last time she did not do it because she was afraid of having GI upset.  She has not been having any upset stomach recently so she was nervous about provoking discomfort with the aspirin.    I have asked her to start aspirin 81 mg every other day or more specifically 3 days a week for couple of weeks.  If there is no GI upset then she can increase to once daily.    5) follow-up: 6 mo      Praful Mueller MD

## 2024-09-27 ENCOUNTER — CLINICAL SUPPORT (OUTPATIENT)
Dept: PRIMARY CARE | Facility: CLINIC | Age: 69
End: 2024-09-27
Payer: MEDICARE

## 2024-09-27 DIAGNOSIS — E53.8 VITAMIN B12 DEFICIENCY: ICD-10-CM

## 2024-09-27 PROCEDURE — 96372 THER/PROPH/DIAG INJ SC/IM: CPT | Performed by: NURSE PRACTITIONER

## 2024-10-04 ENCOUNTER — APPOINTMENT (OUTPATIENT)
Dept: PRIMARY CARE | Facility: CLINIC | Age: 69
End: 2024-10-04
Payer: MEDICARE

## 2024-10-04 DIAGNOSIS — E53.8 VITAMIN B12 DEFICIENCY: ICD-10-CM

## 2024-10-04 PROCEDURE — 96372 THER/PROPH/DIAG INJ SC/IM: CPT | Performed by: NURSE PRACTITIONER

## 2024-10-04 RX ORDER — CYANOCOBALAMIN 1000 UG/ML
1000 INJECTION, SOLUTION INTRAMUSCULAR; SUBCUTANEOUS ONCE
Status: COMPLETED | OUTPATIENT
Start: 2024-10-04 | End: 2024-10-04

## 2024-10-07 ENCOUNTER — APPOINTMENT (OUTPATIENT)
Dept: PHARMACY | Facility: HOSPITAL | Age: 69
End: 2024-10-07
Payer: MEDICARE

## 2024-10-07 DIAGNOSIS — Z86.73 H/O: STROKE: ICD-10-CM

## 2024-10-07 DIAGNOSIS — E78.5 DYSLIPIDEMIA: Primary | ICD-10-CM

## 2024-10-07 NOTE — PROGRESS NOTES
"  Clinical Pharmacy Appointment    Patient ID: Karolyn Martinez is a 69 y.o. female who presents for Hyperlipidemia and Hypertension.    Pt is here for Follow Up appointment.     Referring Provider: Cortney Mcallister A*  PCP: Katiuska Cardenas MD   Last visit with PCP: 9/10/2024   Next visit with PCP: 9/25/2025      Subjective     Interval History  Fasting labs show appropriate improvement on current rosuvastatin 10 mg daily- now LDL 71, extremely near goal of 70 mg/dL    HPI  HYPERTENSION ASSESSMENT    Medication Therapy  Current Medications:   Metoprolol succinate 50 mg daily  Previous Medications: lisinopril- cough/rash listed in allergy     Clarifications to above regimen: none   Adverse Effects: no complaints     Home BP Monitoring  BP Cuff Type: Wrist monitor  Cuff Validated? Yes, reads slightly high compared to office readings    Current home BP readings: highest 130/70-75 mmHg    Admits has not taken blood pressure since last office visit, 118/70 mmHg at office    BP Readings from Last 3 Encounters:   09/26/24 118/70   09/10/24 121/69   04/18/24 128/70       Lifestyle  No recent changes  States much less stress since  passed, not taking  to visits any more, focusing on her own health    9/16 Hx:  Diet: 2 meals/day.   BK: breakfast sandwich (bagel, egg, cheese, jackson or sausage)  LN: if anything a sandwich  Drinks: coffee in morning, no soda  Physical Activity: \"always on the go\", watches grandchildren often    Sodium Intake:  caffeine intake: coffee 1-2 cups per day, has cut back on intake and no longer drinks soda    Risk Assessment  Major Risk Factors Include: Hypertension  Dyslipidemia  Age > 55 (men) or > 65 (women)  Known target organ damage: Stroke or TIA    Secondary Prevention  ASCVD Risk:   The 10-year ASCVD risk score (Arcelia DK, et al., 2019) is: 10.9%    Values used to calculate the score:      Age: 69 years      Sex: Female      Is Non- : No      Diabetic: " No      Tobacco smoker: No      Systolic Blood Pressure: 118 mmHg      Is BP treated: Yes      HDL Cholesterol: 61.7 mg/dL      Total Cholesterol: 144 mg/dL  On Statin?: Yes, rosuvastatin 10 mg daily  Immunizations Needed: RSV  Tobacco Use: former smoker, quit       HYPERLIPIDEMIA ASSESSMENT  Medication Therapy  Current Medications: rosuvastatin 10 mg daily    Adherence: Takes medication as directed and reports no missed doses  Adverse Effects: no complaints    Lipid Panel Review  Lab Results   Component Value Date    TRIG 57 2024    CHOL 144 2024    LDLF 107 (H) 2023    LDLCALC 71 2024    HDL 61.7 2024     Secondary prevention  LDL Goal: < 70 mg/dL    Lifestyle  See above    Medication Reconciliation:  No reported changes    Drug Interactions  The following drug interactions were noted: additive bleed risk on aspirin, fish oil, and sertraline    Medication System Management  Patient's preferred pharmacy: [facility]  Adherence/Organization: appropriate  Affordability/Accessibility: no concerns      Objective   Allergies   Allergen Reactions    Lisinopril Rash    Penicillins Rash     Social History     Social History Narrative    Social History:    , 2 kids    Retired    Former smoker: Quit , Age [age] 1.5 packs/day    No ETOH at all    ---    Family History:    F; Lung CA ( age 78)    M: Colon CA ( age 70s)    B: AML Leukemia( age 74)    S: Ovarian CA ( 72)    S: CAD ( 69)    No living siblings    D: Janey Noonan    D: Susan Lance      Medication Review  Current Outpatient Medications   Medication Instructions    albuterol 90 mcg/actuation inhaler 2 puffs, inhalation, Every 4 hours PRN    aspirin 81 mg, oral, Daily    cholecalciferol (Vitamin D-3) 50 mcg (2,000 unit) capsule 1 capsule, oral, Daily    coenzyme Q-10 100 mg capsule 1 capsule, oral, 2 times daily    cyanocobalamin (Vitamin B-12) 1,000 mcg tablet 1 tablet, oral, Daily     fluticasone (Flonase) 50 mcg/actuation nasal spray nasal, Every 12 hours    metoprolol succinate XL (TOPROL-XL) 50 mg, oral, Daily    omeprazole (PRILOSEC) 40 mg, oral, Daily before breakfast, Do not crush or chew.    rosuvastatin (CRESTOR) 10 mg, oral, Daily    sertraline (ZOLOFT) 50 mg, oral, Daily    valACYclovir (VALTREX) 2,000 mg, oral, 2 times daily      Vitals  BP Readings from Last 2 Encounters:   09/26/24 118/70   09/10/24 121/69     BMI Readings from Last 1 Encounters:   09/26/24 25.61 kg/m²      Labs  A1C  Lab Results   Component Value Date    HGBA1C 5.6 09/20/2024    HGBA1C 5.4 01/09/2023     BMP  Lab Results   Component Value Date    CALCIUM 9.8 09/20/2024     02/17/2024    K 5.2 02/17/2024    CO2 28 02/17/2024     02/17/2024    BUN 16 02/17/2024    CREATININE 0.73 02/17/2024    EGFR 88 02/17/2024     LFTs  Lab Results   Component Value Date    ALT 18 02/17/2024    AST 31 02/17/2024    ALKPHOS 48 02/17/2024    BILITOT 0.5 02/17/2024     FLP  Lab Results   Component Value Date    TRIG 57 09/20/2024    CHOL 144 09/20/2024    LDLF 107 (H) 08/21/2023    LDLCALC 71 09/20/2024    HDL 61.7 09/20/2024     Urine Microalbumin  Lab Results   Component Value Date    MICROALBCREA SEE COMMENT 08/21/2023     Weight Management  Wt Readings from Last 3 Encounters:   09/26/24 63.5 kg (140 lb)   09/10/24 64 kg (141 lb)   04/18/24 64.4 kg (142 lb)      There is no height or weight on file to calculate BMI.     Assessment/Plan   Problem List Items Addressed This Visit       Dyslipidemia - Primary     improving with last clinic  and LDL 71 on 9/20/2024.     LFTs are normal   Lab Results   Component Value Date    ALT 18 02/17/2024    AST 31 02/17/2024    ALKPHOS 48 02/17/2024    BILITOT 0.5 02/17/2024        Rationale for plan: Given patient has improved to within 1 mg/dL of cholesterol goal for LDL, no changes needed at this time. The patient is feeling well and expresses no further questions  today.      Medication Changes:  CONTINUE  Rosuvastatin 10 mg daily    Education and Monitoring:  Goal LDL: </= 70 mg/dL           H/O: stroke     ASSESSMENT OF SMBP MEASUREMENTS  Average: ~120/70  mmHg    Patient's goal BP < 130/80.   Rationale for plan: The patient has been well-controlled at home and at recent office readings. No need for medication changes at this time.  CrCl cannot be calculated (Patient's most recent lab result is older than the maximum 3 days allowed.).    Medication Changes:  Continue  Metoprolol 50 mg daily  Aspirin 81 mg    Future Considerations:  Consider ARB in future if blood pressure becomes uncontrolled given history of stroke    Monitoring and Education Discussed:  Eat right: Your diet should be rich in fruits, vegetables, potassium, and low-fat dairy products. You should also reduce your intake of sodium and fats, particularly saturated fats.  Maintain a healthy weight: Try to achieve and maintain a healthy weight. If you are unsure what this means for you, please contact our clinic.  Exercise: Try to get at least 30 minutes of aerobic exercise every day.  Moderate your alcohol consumption: Limit your alcohol intake to one drink per day.  Monitor your blood pressure: You should check your blood pressure regularly. Notify our clinic if your blood pressure readings are consistently higher than recommended.               Clinical Pharmacist follow-up: as needed for increased BP or patient questions/concerns, Telehealth visit    Continue all meds under the continuation of care with the referring provider and clinical pharmacy team.    Thank you,  Adrianna Hawkins, PharmD  Clinical Pharmacist  [phone]    Verbal consent to manage patient's drug therapy was obtained from the patient. They were informed they may decline to participate or withdraw from participation in pharmacy services at any time.

## 2024-10-07 NOTE — ASSESSMENT & PLAN NOTE
ASSESSMENT OF SMBP MEASUREMENTS  Average: ~120/70  mmHg    Patient's goal BP < 130/80.   Rationale for plan: The patient has been well-controlled at home and at recent office readings. No need for medication changes at this time.  CrCl cannot be calculated (Patient's most recent lab result is older than the maximum 3 days allowed.).    Medication Changes:  Continue  Metoprolol 50 mg daily  Aspirin 81 mg    Future Considerations:  Consider ARB in future if blood pressure becomes uncontrolled given history of stroke    Monitoring and Education Discussed:  Eat right: Your diet should be rich in fruits, vegetables, potassium, and low-fat dairy products. You should also reduce your intake of sodium and fats, particularly saturated fats.  Maintain a healthy weight: Try to achieve and maintain a healthy weight. If you are unsure what this means for you, please contact our clinic.  Exercise: Try to get at least 30 minutes of aerobic exercise every day.  Moderate your alcohol consumption: Limit your alcohol intake to one drink per day.  Monitor your blood pressure: You should check your blood pressure regularly. Notify our clinic if your blood pressure readings are consistently higher than recommended.

## 2024-10-07 NOTE — ASSESSMENT & PLAN NOTE
improving with last clinic  and LDL 71 on 9/20/2024.     LFTs are normal   Lab Results   Component Value Date    ALT 18 02/17/2024    AST 31 02/17/2024    ALKPHOS 48 02/17/2024    BILITOT 0.5 02/17/2024        Rationale for plan: Given patient has improved to within 1 mg/dL of cholesterol goal for LDL, no changes needed at this time. The patient is feeling well and expresses no further questions today.      Medication Changes:  CONTINUE  Rosuvastatin 10 mg daily    Education and Monitoring:  Goal LDL: </= 70 mg/dL

## 2024-10-10 RX ORDER — CYANOCOBALAMIN 1000 UG/ML
1000 INJECTION, SOLUTION INTRAMUSCULAR; SUBCUTANEOUS ONCE
Status: COMPLETED | OUTPATIENT
Start: 2024-10-10 | End: 2024-10-11

## 2024-10-11 ENCOUNTER — APPOINTMENT (OUTPATIENT)
Dept: PRIMARY CARE | Facility: CLINIC | Age: 69
End: 2024-10-11
Payer: MEDICARE

## 2024-10-11 DIAGNOSIS — E53.8 VITAMIN B12 DEFICIENCY: ICD-10-CM

## 2024-10-11 PROCEDURE — 96372 THER/PROPH/DIAG INJ SC/IM: CPT | Performed by: NURSE PRACTITIONER

## 2024-10-18 ENCOUNTER — APPOINTMENT (OUTPATIENT)
Dept: PRIMARY CARE | Facility: CLINIC | Age: 69
End: 2024-10-18
Payer: MEDICARE

## 2024-10-25 ENCOUNTER — APPOINTMENT (OUTPATIENT)
Dept: PRIMARY CARE | Facility: CLINIC | Age: 69
End: 2024-10-25
Payer: MEDICARE

## 2024-10-25 DIAGNOSIS — E53.8 VITAMIN B12 DEFICIENCY: ICD-10-CM

## 2024-10-25 PROCEDURE — 96372 THER/PROPH/DIAG INJ SC/IM: CPT | Performed by: INTERNAL MEDICINE

## 2024-11-13 ENCOUNTER — APPOINTMENT (OUTPATIENT)
Dept: NEUROLOGY | Facility: CLINIC | Age: 69
End: 2024-11-13
Payer: MEDICARE

## 2024-11-22 DIAGNOSIS — E53.8 VITAMIN B12 DEFICIENCY: ICD-10-CM

## 2024-11-22 RX ORDER — CYANOCOBALAMIN 1000 UG/ML
1000 INJECTION, SOLUTION INTRAMUSCULAR; SUBCUTANEOUS
Status: SHIPPED | OUTPATIENT
Start: 2024-11-25 | End: 2025-02-23

## 2024-11-25 ENCOUNTER — APPOINTMENT (OUTPATIENT)
Dept: PRIMARY CARE | Facility: CLINIC | Age: 69
End: 2024-11-25
Payer: MEDICARE

## 2024-11-25 PROCEDURE — 96372 THER/PROPH/DIAG INJ SC/IM: CPT | Performed by: INTERNAL MEDICINE

## 2024-12-20 ENCOUNTER — LAB (OUTPATIENT)
Dept: LAB | Facility: LAB | Age: 69
End: 2024-12-20
Payer: MEDICARE

## 2024-12-20 DIAGNOSIS — E53.8 VITAMIN B12 DEFICIENCY: ICD-10-CM

## 2024-12-20 LAB — VIT B12 SERPL-MCNC: 448 PG/ML (ref 211–911)

## 2024-12-20 PROCEDURE — 36415 COLL VENOUS BLD VENIPUNCTURE: CPT

## 2024-12-20 PROCEDURE — 82607 VITAMIN B-12: CPT

## 2024-12-23 ENCOUNTER — TELEPHONE (OUTPATIENT)
Dept: PRIMARY CARE | Facility: CLINIC | Age: 69
End: 2024-12-23

## 2024-12-23 ENCOUNTER — APPOINTMENT (OUTPATIENT)
Dept: PRIMARY CARE | Facility: CLINIC | Age: 69
End: 2024-12-23
Payer: MEDICARE

## 2024-12-23 DIAGNOSIS — E53.8 VITAMIN B12 DEFICIENCY: ICD-10-CM

## 2024-12-23 PROCEDURE — 96372 THER/PROPH/DIAG INJ SC/IM: CPT | Performed by: INTERNAL MEDICINE

## 2024-12-23 NOTE — TELEPHONE ENCOUNTER
Patient indefinitely to be out of town   Can she take oral b12 to maintain her current level of 448

## 2024-12-23 NOTE — TELEPHONE ENCOUNTER
Wont know until we check her while on it  Can certainly try to replace it since we have no other option  Can place lab to be checked whenever she can do it  Take anywhere from 1000mcg-2500mcg daily

## 2025-01-17 DIAGNOSIS — I25.10 CORONARY ARTERY CALCIFICATION: ICD-10-CM

## 2025-01-17 DIAGNOSIS — E78.5 HYPERLIPIDEMIA, UNSPECIFIED HYPERLIPIDEMIA TYPE: ICD-10-CM

## 2025-01-18 RX ORDER — ROSUVASTATIN CALCIUM 10 MG/1
10 TABLET, COATED ORAL DAILY
Qty: 90 TABLET | Refills: 3 | Status: SHIPPED | OUTPATIENT
Start: 2025-01-18

## 2025-04-08 ENCOUNTER — OFFICE VISIT (OUTPATIENT)
Dept: CARDIOLOGY | Facility: CLINIC | Age: 70
End: 2025-04-08
Payer: MEDICARE

## 2025-04-08 VITALS
WEIGHT: 142 LBS | OXYGEN SATURATION: 98 % | DIASTOLIC BLOOD PRESSURE: 70 MMHG | BODY MASS INDEX: 26.13 KG/M2 | HEIGHT: 62 IN | SYSTOLIC BLOOD PRESSURE: 132 MMHG | HEART RATE: 78 BPM

## 2025-04-08 DIAGNOSIS — I25.10 CORONARY ARTERY CALCIFICATION: Primary | ICD-10-CM

## 2025-04-08 DIAGNOSIS — E78.5 DYSLIPIDEMIA: ICD-10-CM

## 2025-04-08 DIAGNOSIS — Z86.73 H/O: STROKE: ICD-10-CM

## 2025-04-08 DIAGNOSIS — I10 PRIMARY HYPERTENSION: ICD-10-CM

## 2025-04-08 PROCEDURE — 1036F TOBACCO NON-USER: CPT | Performed by: INTERNAL MEDICINE

## 2025-04-08 PROCEDURE — 3075F SYST BP GE 130 - 139MM HG: CPT | Performed by: INTERNAL MEDICINE

## 2025-04-08 PROCEDURE — 1159F MED LIST DOCD IN RCRD: CPT | Performed by: INTERNAL MEDICINE

## 2025-04-08 PROCEDURE — 93010 ELECTROCARDIOGRAM REPORT: CPT | Performed by: INTERNAL MEDICINE

## 2025-04-08 PROCEDURE — 1160F RVW MEDS BY RX/DR IN RCRD: CPT | Performed by: INTERNAL MEDICINE

## 2025-04-08 PROCEDURE — 3008F BODY MASS INDEX DOCD: CPT | Performed by: INTERNAL MEDICINE

## 2025-04-08 PROCEDURE — 1123F ACP DISCUSS/DSCN MKR DOCD: CPT | Performed by: INTERNAL MEDICINE

## 2025-04-08 PROCEDURE — 93005 ELECTROCARDIOGRAM TRACING: CPT | Performed by: INTERNAL MEDICINE

## 2025-04-08 PROCEDURE — 99214 OFFICE O/P EST MOD 30 MIN: CPT | Performed by: INTERNAL MEDICINE

## 2025-04-08 PROCEDURE — 3078F DIAST BP <80 MM HG: CPT | Performed by: INTERNAL MEDICINE

## 2025-04-08 PROCEDURE — 99214 OFFICE O/P EST MOD 30 MIN: CPT | Mod: 25 | Performed by: INTERNAL MEDICINE

## 2025-04-08 NOTE — PROGRESS NOTES
Chief Complaint:   No chief complaint on file.     History Of Present Illness:    Karolyn Martinez is a 70 y.o. female with a history of dyslipidemia, coronary artery calcification, palpitations (likely secondary to paroxysmal SVT), prior tobacco use, and a family history of premature CAD here for follow-up.    She is doing well from a cardiovascular standpoint.  She denies any exertional chest pain or shortness of breath.    Her  Franky passed away on 4/24/2024.  She still is having a hard time with this.  She is taking comfort and the fact that he is no longer suffering.    She is planning on going to [place] to see her 1 grandsons first communion.  She then wants to get back so that she can see her granddaughter graduate from middle school.    Nuclear stress test 9/30/2021: No evidence of ischemia or scar.  EF greater than 75%.    CT calcium score August 2021: Total score 328  LM 16    LCx 72  RCA 45  Ascending aorta measuring 3.2 cm    ZioPatch 5/7/18 demonstrated a short run of atrial tachycardia. Triggered event corresponded to sinus rhythm.     Stress test 5/7/18 demonstrating no ST or T changes. Chavez treadmill score of 7     Echocardiogram 5/21/18 demonstrating normal LV size and function, EF 60-65%. Grade 1 diastolic dysfunction. Normal RV size and function. Trace TR.       Allergies:  Lisinopril and Penicillins    Outpatient Medications:  Current Outpatient Medications   Medication Instructions    albuterol 90 mcg/actuation inhaler 2 puffs, Every 4 hours PRN    cholecalciferol (Vitamin D-3) 50 mcg (2,000 unit) capsule 1 capsule, Daily    coenzyme Q-10 100 mg capsule 1 capsule, 2 times daily    cyanocobalamin (Vitamin B-12) 1,000 mcg tablet 1 tablet, Daily    fluticasone (Flonase) 50 mcg/actuation nasal spray Every 12 hours    metoprolol succinate XL (TOPROL-XL) 50 mg, oral, Daily    omeprazole (PRILOSEC) 40 mg, oral, Daily before breakfast, Do not crush or chew.    rosuvastatin (CRESTOR) 10  "mg, oral, Daily    sertraline (ZOLOFT) 50 mg, oral, Daily    valACYclovir (VALTREX) 2,000 mg, oral, 2 times daily       Last Recorded Vitals:  Visit Vitals  /70 (BP Location: Right arm, Patient Position: Sitting)   Pulse 78   Ht 1.575 m (5' 2\")   Wt 64.4 kg (142 lb)   SpO2 98%   BMI 25.97 kg/m²   OB Status Postmenopausal   Smoking Status Former   BSA 1.68 m²      LASTWT(3):   Wt Readings from Last 3 Encounters:   04/08/25 64.4 kg (142 lb)   09/26/24 63.5 kg (140 lb)   09/10/24 64 kg (141 lb)       Physical Exam:  In general: alert and in no acute distress.   HEENT: Carotid upstrokes normal with no bruits. JVP is normal.   Pulmonary: Clear to auscultation bilaterally.  Cardiovascular: S1,S2, regular. No appreciable murmurs, rubs or gallops.   Lower extremities: Warm. 2+ distal pulses. No edema.     Last Labs:  CBC -  Recent Labs     02/17/24  0608 02/15/24  0839 08/21/23  1031   WBC 4.6 5.0 4.8   HGB 14.0 14.3 14.3   HCT 43.8 44.1 44.1    204 187   MCV 96 95 95       CMP -  Recent Labs     02/17/24  0608 02/15/24  0839 08/21/23  1031    141 141   K 5.2 5.1 4.4    104 103   CO2 28 31 30   ANIONGAP 11 11 12   BUN 16 12 17   CREATININE 0.73 0.87 0.80   EGFR 88 71  --    MG 2.25  --   --      Recent Labs     02/17/24  0608 02/15/24  0839 08/21/23  1031   ALBUMIN 4.4 4.6 4.4   ALKPHOS 48 51 50   ALT 18 17 14   AST 31 20 18   BILITOT 0.5 0.6 0.5       LIPID PANEL -   Recent Labs     09/20/24  0751 02/15/24  0839 08/21/23  1031 07/01/22  0758 09/30/21  0715   CHOL 144 198 175 180 141   LDLCALC 71 123*  --   --   --    LDLF  --   --  107* 116* 80   HDL 61.7 60.4 56.1 52.0 49.0   TRIG 57 73 61 61 59       Recent Labs     09/20/24  0751 01/09/23  0705   HGBA1C 5.6 5.4           Assessment/Plan   1) coronary artery calcification: Mildly to moderately elevated score 2021 with subsequent stress testing demonstrating low likelihood of flow-limiting coronary disease.  Once again we will continue with " lifestyle and risk factor modification.    2) hypertension: Blood pressure well-controlled.  Continue with metoprolol succinate 50 mg daily.    3) dyslipidemia: Started on statin therapy and LDL has been very well-controlled.    4) CVA: With a lacunar infarct it is most likely at this was hypertensive event.  Has been off of aspirin because of concern for GI upset.  Continue with rosuvastatin 10 mg daily.    5) follow-up: 6 mo or sooner if needed.      Praful Mueller MD

## 2025-07-23 DIAGNOSIS — Z12.31 ENCOUNTER FOR SCREENING MAMMOGRAM FOR BREAST CANCER: ICD-10-CM

## 2025-09-25 ENCOUNTER — APPOINTMENT (OUTPATIENT)
Dept: PRIMARY CARE | Facility: CLINIC | Age: 70
End: 2025-09-25
Payer: MEDICARE